# Patient Record
Sex: MALE | Race: WHITE | NOT HISPANIC OR LATINO | Employment: OTHER | RURAL
[De-identification: names, ages, dates, MRNs, and addresses within clinical notes are randomized per-mention and may not be internally consistent; named-entity substitution may affect disease eponyms.]

---

## 2017-12-22 ENCOUNTER — HISTORICAL (OUTPATIENT)
Dept: ADMINISTRATIVE | Facility: HOSPITAL | Age: 67
End: 2017-12-22

## 2017-12-26 LAB
LAB AP CLINICAL INFORMATION: NORMAL
LAB AP DIAGNOSIS - HISTORICAL: NORMAL
LAB AP GROSS PATHOLOGY - HISTORICAL: NORMAL
LAB AP SPECIMEN SUBMITTED - HISTORICAL: NORMAL

## 2020-03-06 ENCOUNTER — HISTORICAL (OUTPATIENT)
Dept: ADMINISTRATIVE | Facility: HOSPITAL | Age: 70
End: 2020-03-06

## 2020-03-06 LAB
HCT VFR BLD AUTO: 47 % (ref 40–54)
HGB BLD-MCNC: 15.6 G/DL (ref 13.5–18)
MCHC RBC AUTO-ENTMCNC: 33.2 G/DL (ref 32–36)

## 2020-03-19 ENCOUNTER — HISTORICAL (OUTPATIENT)
Dept: ADMINISTRATIVE | Facility: HOSPITAL | Age: 70
End: 2020-03-19

## 2020-09-18 ENCOUNTER — HISTORICAL (OUTPATIENT)
Dept: ADMINISTRATIVE | Facility: HOSPITAL | Age: 70
End: 2020-09-18

## 2020-09-19 LAB — SARS-COV+SARS-COV-2 AG RESP QL IA.RAPID: NEGATIVE

## 2020-12-15 ENCOUNTER — HISTORICAL (OUTPATIENT)
Dept: ADMINISTRATIVE | Facility: HOSPITAL | Age: 70
End: 2020-12-15

## 2020-12-15 LAB
ALBUMIN SERPL BCP-MCNC: 3.5 G/DL (ref 3.5–5)
ALBUMIN/GLOB SERPL: 0.9 {RATIO}
ALP SERPL-CCNC: 367 U/L (ref 45–115)
ALT SERPL W P-5'-P-CCNC: 635 U/L (ref 16–61)
AMYLASE SERPL-CCNC: 2468 U/L (ref 25–115)
ANION GAP SERPL CALCULATED.3IONS-SCNC: 15 MMOL/L
AST SERPL W P-5'-P-CCNC: 449 U/L (ref 15–37)
BACTERIA #/AREA URNS HPF: ABNORMAL /HPF
BASOPHILS # BLD AUTO: 0.03 X10E3/UL (ref 0–0.2)
BASOPHILS NFR BLD AUTO: 0.2 % (ref 0–1)
BILIRUB SERPL-MCNC: 7.3 MG/DL (ref 0–1.2)
BILIRUB UR QL STRIP: ABNORMAL MG/DL
BUN SERPL-MCNC: 11 MG/DL (ref 7–18)
BUN/CREAT SERPL: 12
CALCIUM SERPL-MCNC: 8.9 MG/DL (ref 8.5–10.1)
CHLORIDE SERPL-SCNC: 105 MMOL/L (ref 98–107)
CLARITY UR: CLEAR
CLARITY UR: CLEAR
CO2 SERPL-SCNC: 25 MMOL/L (ref 21–32)
COLOR UR: ABNORMAL
COLOR UR: ABNORMAL
CREAT SERPL-MCNC: 0.92 MG/DL (ref 0.7–1.3)
EOSINOPHIL # BLD AUTO: 0 X10E3/UL (ref 0–0.5)
EOSINOPHIL NFR BLD AUTO: 0 % (ref 1–4)
ERYTHROCYTE [DISTWIDTH] IN BLOOD BY AUTOMATED COUNT: 13.8 % (ref 11.5–14.5)
GLOBULIN SER-MCNC: 3.9 G/DL (ref 2–4)
GLUCOSE SERPL-MCNC: 151 MG/DL (ref 74–106)
GLUCOSE UR STRIP-MCNC: 250 MG/DL
HCT VFR BLD AUTO: 46.6 % (ref 40–54)
HGB BLD-MCNC: 15.8 G/DL (ref 13.5–18)
HYALINE CASTS #/AREA URNS LPF: ABNORMAL /LPF (ref 0–2)
IMM GRANULOCYTES # BLD AUTO: 0.03 X10E3/UL (ref 0–0.04)
IMM GRANULOCYTES NFR BLD: 0.2 % (ref 0–0.4)
KETONES UR STRIP-SCNC: 40 MG/DL
LEUKOCYTE ESTERASE UR QL STRIP: NEGATIVE LEU/UL
LIPASE SERPL-CCNC: ABNORMAL U/L (ref 73–393)
LYMPHOCYTES # BLD AUTO: 1 X10E3/UL (ref 1–4.8)
LYMPHOCYTES NFR BLD AUTO: 7.4 % (ref 27–41)
MCH RBC QN AUTO: 33 PG (ref 27–31)
MCHC RBC AUTO-ENTMCNC: 33.9 G/DL (ref 32–36)
MCV RBC AUTO: 97.3 FL (ref 80–96)
MONOCYTES # BLD AUTO: 0.51 X10E3/UL (ref 0–0.8)
MONOCYTES NFR BLD AUTO: 3.8 % (ref 2–6)
MPC BLD CALC-MCNC: 12 FL (ref 9.4–12.4)
MUCOUS THREADS #/AREA URNS HPF: ABNORMAL /HPF
NEUTROPHILS # BLD AUTO: 11.9 X10E3/UL (ref 1.8–7.7)
NEUTROPHILS NFR BLD AUTO: 88.4 % (ref 53–65)
NITRITE UR QL STRIP: NEGATIVE
PH UR STRIP: 6 PH UNITS (ref 5–8)
PLATELET # BLD AUTO: 167 X10E3/UL (ref 150–400)
POTASSIUM SERPL-SCNC: 3.6 MMOL/L (ref 3.5–5.1)
PROT SERPL-MCNC: 7.4 G/DL (ref 6.4–8.2)
PROT UR QL STRIP: 100 MG/DL
RBC # BLD AUTO: 4.79 X10E6/UL (ref 4.6–6.2)
RBC # UR STRIP: NEGATIVE ERY/UL
RBC #/AREA URNS HPF: ABNORMAL /HPF (ref 0–3)
SODIUM SERPL-SCNC: 141 MMOL/L (ref 136–145)
SP GR UR STRIP: 1.02 (ref 1–1.03)
SQUAMOUS #/AREA URNS LPF: ABNORMAL /LPF
UROBILINOGEN UR STRIP-ACNC: 2 EU/DL
WBC # BLD AUTO: 13.47 X10E3/UL (ref 4.5–11)
WBC #/AREA URNS HPF: ABNORMAL /HPF (ref 0–5)

## 2020-12-18 LAB
REPORT: NO GROWTH
REPORT: NORMAL

## 2021-03-11 VITALS — WEIGHT: 235 LBS | BODY MASS INDEX: 31.83 KG/M2 | HEIGHT: 72 IN

## 2021-03-11 RX ORDER — ATORVASTATIN CALCIUM 40 MG/1
40 TABLET, FILM COATED ORAL DAILY
COMMUNITY
End: 2022-04-04 | Stop reason: SDUPTHER

## 2021-03-11 RX ORDER — MONTELUKAST SODIUM 10 MG/1
10 TABLET ORAL DAILY
COMMUNITY
End: 2022-04-04 | Stop reason: SDUPTHER

## 2021-03-11 RX ORDER — TAMSULOSIN HYDROCHLORIDE 0.4 MG/1
0.4 CAPSULE ORAL DAILY
COMMUNITY
End: 2022-04-04 | Stop reason: SDUPTHER

## 2021-03-16 DIAGNOSIS — Z12.11 COLON CANCER SCREENING: Primary | ICD-10-CM

## 2021-03-17 DIAGNOSIS — Z11.59 SCREENING FOR VIRAL DISEASE: Primary | ICD-10-CM

## 2021-03-18 DIAGNOSIS — Z11.59 SCREENING FOR VIRAL DISEASE: Primary | ICD-10-CM

## 2021-03-19 ENCOUNTER — ANESTHESIA EVENT (OUTPATIENT)
Dept: GASTROENTEROLOGY | Facility: HOSPITAL | Age: 71
End: 2021-03-19
Payer: MEDICARE

## 2021-03-19 ENCOUNTER — HOSPITAL ENCOUNTER (OUTPATIENT)
Dept: GASTROENTEROLOGY | Facility: HOSPITAL | Age: 71
Discharge: HOME | End: 2021-03-19
Payer: MEDICARE

## 2021-03-19 ENCOUNTER — ANESTHESIA (OUTPATIENT)
Dept: GASTROENTEROLOGY | Facility: HOSPITAL | Age: 71
End: 2021-03-19
Payer: MEDICARE

## 2021-03-19 VITALS
HEART RATE: 57 BPM | RESPIRATION RATE: 13 BRPM | WEIGHT: 224 LBS | BODY MASS INDEX: 30.38 KG/M2 | TEMPERATURE: 98 F | SYSTOLIC BLOOD PRESSURE: 124 MMHG | OXYGEN SATURATION: 93 % | DIASTOLIC BLOOD PRESSURE: 73 MMHG

## 2021-03-19 DIAGNOSIS — Z12.11 COLON CANCER SCREENING: ICD-10-CM

## 2021-03-19 LAB
CTP QC/QA: YES
SARS-COV-2 RDRP RESP QL NAA+PROBE: NEGATIVE

## 2021-03-19 PROCEDURE — 37000008 HC ANESTHESIA 1ST 15 MINUTES

## 2021-03-19 PROCEDURE — 25000003 PHARM REV CODE 250: Performed by: NURSE ANESTHETIST, CERTIFIED REGISTERED

## 2021-03-19 PROCEDURE — 27000284 HC CANNULA NASAL: Performed by: NURSE ANESTHETIST, CERTIFIED REGISTERED

## 2021-03-19 PROCEDURE — D9220A PRA ANESTHESIA: ICD-10-PCS | Mod: ,,, | Performed by: NURSE ANESTHETIST, CERTIFIED REGISTERED

## 2021-03-19 PROCEDURE — D9220A PRA ANESTHESIA: Mod: ,,, | Performed by: NURSE ANESTHETIST, CERTIFIED REGISTERED

## 2021-03-19 PROCEDURE — 27201423 OPTIME MED/SURG SUP & DEVICES STERILE SUPPLY

## 2021-03-19 PROCEDURE — G0105 COLORECTAL SCRN; HI RISK IND: HCPCS

## 2021-03-19 PROCEDURE — 37000009 HC ANESTHESIA EA ADD 15 MINS

## 2021-03-19 RX ORDER — SODIUM CHLORIDE 0.9 % (FLUSH) 0.9 %
10 SYRINGE (ML) INJECTION
Status: DISCONTINUED | OUTPATIENT
Start: 2021-03-19 | End: 2021-03-20 | Stop reason: HOSPADM

## 2021-03-19 RX ORDER — LIDOCAINE HYDROCHLORIDE 20 MG/ML
INJECTION INTRAVENOUS
Status: DISCONTINUED | OUTPATIENT
Start: 2021-03-19 | End: 2021-03-19

## 2021-03-19 RX ORDER — PROPOFOL 10 MG/ML
VIAL (ML) INTRAVENOUS
Status: DISCONTINUED | OUTPATIENT
Start: 2021-03-19 | End: 2021-03-19

## 2021-03-19 RX ADMIN — Medication 50 MG: at 09:03

## 2021-03-19 RX ADMIN — Medication 100 MG: at 09:03

## 2021-03-19 RX ADMIN — LIDOCAINE HYDROCHLORIDE 50 MG: 20 INJECTION, SOLUTION INTRAVENOUS at 09:03

## 2021-06-16 ENCOUNTER — OFFICE VISIT (OUTPATIENT)
Dept: GASTROENTEROLOGY | Facility: CLINIC | Age: 71
End: 2021-06-16
Payer: MEDICARE

## 2021-06-16 VITALS
DIASTOLIC BLOOD PRESSURE: 78 MMHG | OXYGEN SATURATION: 97 % | BODY MASS INDEX: 28.85 KG/M2 | HEART RATE: 57 BPM | HEIGHT: 75 IN | SYSTOLIC BLOOD PRESSURE: 124 MMHG | WEIGHT: 232 LBS

## 2021-06-16 DIAGNOSIS — R10.9 ABDOMINAL PAIN, UNSPECIFIED ABDOMINAL LOCATION: Primary | ICD-10-CM

## 2021-06-16 DIAGNOSIS — K59.00 CONSTIPATION, UNSPECIFIED CONSTIPATION TYPE: ICD-10-CM

## 2021-06-16 DIAGNOSIS — R19.4 RECENT CHANGE IN FREQUENCY OF BOWEL MOVEMENTS: ICD-10-CM

## 2021-06-16 PROCEDURE — 99214 PR OFFICE/OUTPT VISIT, EST, LEVL IV, 30-39 MIN: ICD-10-PCS | Mod: ,,, | Performed by: NURSE PRACTITIONER

## 2021-06-16 PROCEDURE — 99214 OFFICE O/P EST MOD 30 MIN: CPT | Mod: ,,, | Performed by: NURSE PRACTITIONER

## 2021-06-17 ENCOUNTER — TELEPHONE (OUTPATIENT)
Dept: GASTROENTEROLOGY | Facility: CLINIC | Age: 71
End: 2021-06-17

## 2021-09-16 ENCOUNTER — OFFICE VISIT (OUTPATIENT)
Dept: GASTROENTEROLOGY | Facility: CLINIC | Age: 71
End: 2021-09-16
Payer: MEDICARE

## 2021-09-16 VITALS
WEIGHT: 239 LBS | HEIGHT: 75 IN | SYSTOLIC BLOOD PRESSURE: 137 MMHG | DIASTOLIC BLOOD PRESSURE: 76 MMHG | BODY MASS INDEX: 29.72 KG/M2 | OXYGEN SATURATION: 96 % | HEART RATE: 57 BPM

## 2021-09-16 DIAGNOSIS — R19.7 DIARRHEA, UNSPECIFIED TYPE: Primary | ICD-10-CM

## 2021-09-16 DIAGNOSIS — R19.4 RECENT CHANGE IN FREQUENCY OF BOWEL MOVEMENTS: ICD-10-CM

## 2021-09-16 DIAGNOSIS — K59.00 CONSTIPATION, UNSPECIFIED CONSTIPATION TYPE: ICD-10-CM

## 2021-09-16 DIAGNOSIS — R10.9 ABDOMINAL PAIN, UNSPECIFIED ABDOMINAL LOCATION: ICD-10-CM

## 2021-09-16 PROCEDURE — 99213 PR OFFICE/OUTPT VISIT, EST, LEVL III, 20-29 MIN: ICD-10-PCS | Mod: ,,, | Performed by: NURSE PRACTITIONER

## 2021-09-16 PROCEDURE — 99213 OFFICE O/P EST LOW 20 MIN: CPT | Mod: ,,, | Performed by: NURSE PRACTITIONER

## 2021-09-16 RX ORDER — MONTELUKAST SODIUM 4 MG/1
1 TABLET, CHEWABLE ORAL 2 TIMES DAILY
Qty: 180 TABLET | Refills: 3 | Status: SHIPPED | OUTPATIENT
Start: 2021-09-16 | End: 2021-11-04

## 2021-09-16 RX ORDER — TETRACYCLINE HYDROCHLORIDE 250 MG/1
250 CAPSULE ORAL 4 TIMES DAILY
COMMUNITY
End: 2023-07-05 | Stop reason: ALTCHOICE

## 2021-11-02 ENCOUNTER — CLINICAL SUPPORT (OUTPATIENT)
Dept: FAMILY MEDICINE | Facility: CLINIC | Age: 71
End: 2021-11-02
Payer: MEDICARE

## 2021-11-02 DIAGNOSIS — Z23 NEED FOR PROPHYLACTIC VACCINATION AND INOCULATION AGAINST INFLUENZA: Primary | ICD-10-CM

## 2021-11-02 PROCEDURE — G0008 FLU VACCINE - QUADRIVALENT - HIGH DOSE (65+) PRESERVATIVE FREE IM: ICD-10-PCS | Mod: ,,, | Performed by: NURSE PRACTITIONER

## 2021-11-02 PROCEDURE — 90662 IIV NO PRSV INCREASED AG IM: CPT | Mod: ,,, | Performed by: NURSE PRACTITIONER

## 2021-11-02 PROCEDURE — G0008 ADMIN INFLUENZA VIRUS VAC: HCPCS | Mod: ,,, | Performed by: NURSE PRACTITIONER

## 2021-11-02 PROCEDURE — 90662 FLU VACCINE - QUADRIVALENT - HIGH DOSE (65+) PRESERVATIVE FREE IM: ICD-10-PCS | Mod: ,,, | Performed by: NURSE PRACTITIONER

## 2021-11-04 ENCOUNTER — OFFICE VISIT (OUTPATIENT)
Dept: FAMILY MEDICINE | Facility: CLINIC | Age: 71
End: 2021-11-04
Payer: MEDICARE

## 2021-11-04 VITALS
BODY MASS INDEX: 29.97 KG/M2 | DIASTOLIC BLOOD PRESSURE: 74 MMHG | WEIGHT: 241 LBS | HEART RATE: 58 BPM | TEMPERATURE: 98 F | HEIGHT: 75 IN | OXYGEN SATURATION: 98 % | RESPIRATION RATE: 18 BRPM | SYSTOLIC BLOOD PRESSURE: 130 MMHG

## 2021-11-04 DIAGNOSIS — J34.89 LESION OF NASAL SEPTUM: Primary | ICD-10-CM

## 2021-11-04 PROCEDURE — 99213 PR OFFICE/OUTPT VISIT, EST, LEVL III, 20-29 MIN: ICD-10-PCS | Mod: ,,, | Performed by: NURSE PRACTITIONER

## 2021-11-04 PROCEDURE — 99213 OFFICE O/P EST LOW 20 MIN: CPT | Mod: ,,, | Performed by: NURSE PRACTITIONER

## 2021-11-04 RX ORDER — MUPIROCIN 20 MG/G
OINTMENT TOPICAL 3 TIMES DAILY
Qty: 15 G | Refills: 0 | Status: SHIPPED | OUTPATIENT
Start: 2021-11-04 | End: 2021-11-11

## 2021-11-08 ENCOUNTER — OFFICE VISIT (OUTPATIENT)
Dept: OTOLARYNGOLOGY | Facility: CLINIC | Age: 71
End: 2021-11-08
Payer: MEDICARE

## 2021-11-08 VITALS — WEIGHT: 241 LBS | HEIGHT: 75 IN | BODY MASS INDEX: 29.97 KG/M2

## 2021-11-08 DIAGNOSIS — J34.89 LESION OF NASAL SEPTUM: ICD-10-CM

## 2021-11-08 PROCEDURE — 99204 PR OFFICE/OUTPT VISIT, NEW, LEVL IV, 45-59 MIN: ICD-10-PCS | Mod: S$PBB,,, | Performed by: OTOLARYNGOLOGY

## 2021-11-08 PROCEDURE — 99213 OFFICE O/P EST LOW 20 MIN: CPT | Mod: PBBFAC | Performed by: OTOLARYNGOLOGY

## 2021-11-08 PROCEDURE — 99204 OFFICE O/P NEW MOD 45 MIN: CPT | Mod: S$PBB,,, | Performed by: OTOLARYNGOLOGY

## 2022-04-04 ENCOUNTER — OFFICE VISIT (OUTPATIENT)
Dept: FAMILY MEDICINE | Facility: CLINIC | Age: 72
End: 2022-04-04
Payer: MEDICARE

## 2022-04-04 VITALS
HEART RATE: 68 BPM | HEIGHT: 75 IN | RESPIRATION RATE: 18 BRPM | WEIGHT: 240 LBS | DIASTOLIC BLOOD PRESSURE: 79 MMHG | OXYGEN SATURATION: 98 % | BODY MASS INDEX: 29.84 KG/M2 | SYSTOLIC BLOOD PRESSURE: 128 MMHG | TEMPERATURE: 98 F

## 2022-04-04 DIAGNOSIS — J32.1 FRONTAL SINUSITIS, UNSPECIFIED CHRONICITY: Primary | ICD-10-CM

## 2022-04-04 DIAGNOSIS — R53.83 FATIGUE, UNSPECIFIED TYPE: ICD-10-CM

## 2022-04-04 DIAGNOSIS — Z12.5 SCREENING FOR PROSTATE CANCER: ICD-10-CM

## 2022-04-04 DIAGNOSIS — E78.5 HYPERLIPIDEMIA, UNSPECIFIED HYPERLIPIDEMIA TYPE: ICD-10-CM

## 2022-04-04 LAB
ALBUMIN SERPL BCP-MCNC: 3.9 G/DL (ref 3.5–5)
ALBUMIN/GLOB SERPL: 1.1 {RATIO}
ALP SERPL-CCNC: 116 U/L (ref 45–115)
ALT SERPL W P-5'-P-CCNC: 35 U/L (ref 16–61)
ANION GAP SERPL CALCULATED.3IONS-SCNC: 11 MMOL/L (ref 7–16)
AST SERPL W P-5'-P-CCNC: 26 U/L (ref 15–37)
BASOPHILS # BLD AUTO: 0.04 K/UL (ref 0–0.2)
BASOPHILS NFR BLD AUTO: 0.6 % (ref 0–1)
BILIRUB SERPL-MCNC: 0.8 MG/DL (ref 0–1.2)
BUN SERPL-MCNC: 11 MG/DL (ref 7–18)
BUN/CREAT SERPL: 14 (ref 6–20)
CALCIUM SERPL-MCNC: 9.2 MG/DL (ref 8.5–10.1)
CHLORIDE SERPL-SCNC: 105 MMOL/L (ref 98–107)
CHOLEST SERPL-MCNC: 140 MG/DL (ref 0–200)
CHOLEST/HDLC SERPL: 3.2 {RATIO}
CO2 SERPL-SCNC: 30 MMOL/L (ref 21–32)
CREAT SERPL-MCNC: 0.78 MG/DL (ref 0.7–1.3)
DIFFERENTIAL METHOD BLD: ABNORMAL
EOSINOPHIL # BLD AUTO: 0.07 K/UL (ref 0–0.5)
EOSINOPHIL NFR BLD AUTO: 1.1 % (ref 1–4)
ERYTHROCYTE [DISTWIDTH] IN BLOOD BY AUTOMATED COUNT: 13.3 % (ref 11.5–14.5)
GLOBULIN SER-MCNC: 3.5 G/DL (ref 2–4)
GLUCOSE SERPL-MCNC: 99 MG/DL (ref 74–106)
HCT VFR BLD AUTO: 48.4 % (ref 40–54)
HDLC SERPL-MCNC: 44 MG/DL (ref 40–60)
HGB BLD-MCNC: 15.9 G/DL (ref 13.5–18)
IMM GRANULOCYTES # BLD AUTO: 0.02 K/UL (ref 0–0.04)
IMM GRANULOCYTES NFR BLD: 0.3 % (ref 0–0.4)
LDLC SERPL CALC-MCNC: 73 MG/DL
LDLC/HDLC SERPL: 1.7 {RATIO}
LYMPHOCYTES # BLD AUTO: 2.09 K/UL (ref 1–4.8)
LYMPHOCYTES NFR BLD AUTO: 32.9 % (ref 27–41)
MCH RBC QN AUTO: 32.5 PG (ref 27–31)
MCHC RBC AUTO-ENTMCNC: 32.9 G/DL (ref 32–36)
MCV RBC AUTO: 99 FL (ref 80–96)
MONOCYTES # BLD AUTO: 0.4 K/UL (ref 0–0.8)
MONOCYTES NFR BLD AUTO: 6.3 % (ref 2–6)
MPC BLD CALC-MCNC: 12.9 FL (ref 9.4–12.4)
NEUTROPHILS # BLD AUTO: 3.73 K/UL (ref 1.8–7.7)
NEUTROPHILS NFR BLD AUTO: 58.8 % (ref 53–65)
NONHDLC SERPL-MCNC: 96 MG/DL
NRBC # BLD AUTO: 0 X10E3/UL
NRBC, AUTO (.00): 0 %
PLATELET # BLD AUTO: 167 K/UL (ref 150–400)
POTASSIUM SERPL-SCNC: 4.7 MMOL/L (ref 3.5–5.1)
PROT SERPL-MCNC: 7.4 G/DL (ref 6.4–8.2)
PSA SERPL-MCNC: 0.48 NG/ML (ref 0–4.4)
RBC # BLD AUTO: 4.89 M/UL (ref 4.6–6.2)
SODIUM SERPL-SCNC: 141 MMOL/L (ref 136–145)
TRIGL SERPL-MCNC: 114 MG/DL (ref 35–150)
VLDLC SERPL-MCNC: 23 MG/DL
WBC # BLD AUTO: 6.35 K/UL (ref 4.5–11)

## 2022-04-04 PROCEDURE — 80053 COMPREHEN METABOLIC PANEL: CPT | Mod: ,,, | Performed by: CLINICAL MEDICAL LABORATORY

## 2022-04-04 PROCEDURE — 99213 PR OFFICE/OUTPT VISIT, EST, LEVL III, 20-29 MIN: ICD-10-PCS | Mod: ,,, | Performed by: NURSE PRACTITIONER

## 2022-04-04 PROCEDURE — 99213 OFFICE O/P EST LOW 20 MIN: CPT | Mod: ,,, | Performed by: NURSE PRACTITIONER

## 2022-04-04 PROCEDURE — 80053 COMPREHENSIVE METABOLIC PANEL: ICD-10-PCS | Mod: ,,, | Performed by: CLINICAL MEDICAL LABORATORY

## 2022-04-04 PROCEDURE — 85025 COMPLETE CBC W/AUTO DIFF WBC: CPT | Mod: ,,, | Performed by: CLINICAL MEDICAL LABORATORY

## 2022-04-04 PROCEDURE — 80061 LIPID PANEL: CPT | Mod: ,,, | Performed by: CLINICAL MEDICAL LABORATORY

## 2022-04-04 PROCEDURE — 85025 CBC WITH DIFFERENTIAL: ICD-10-PCS | Mod: ,,, | Performed by: CLINICAL MEDICAL LABORATORY

## 2022-04-04 PROCEDURE — G0103 PSA, SCREENING: ICD-10-PCS | Mod: ,,, | Performed by: CLINICAL MEDICAL LABORATORY

## 2022-04-04 PROCEDURE — G0103 PSA SCREENING: HCPCS | Mod: ,,, | Performed by: CLINICAL MEDICAL LABORATORY

## 2022-04-04 PROCEDURE — 80061 LIPID PANEL: ICD-10-PCS | Mod: ,,, | Performed by: CLINICAL MEDICAL LABORATORY

## 2022-04-04 RX ORDER — TAMSULOSIN HYDROCHLORIDE 0.4 MG/1
0.4 CAPSULE ORAL DAILY
Qty: 90 CAPSULE | Refills: 1 | Status: SHIPPED | OUTPATIENT
Start: 2022-04-04 | End: 2022-10-05

## 2022-04-04 RX ORDER — ALBUTEROL SULFATE 90 UG/1
2 AEROSOL, METERED RESPIRATORY (INHALATION) EVERY 6 HOURS PRN
Qty: 18 G | Refills: 3 | Status: SHIPPED | OUTPATIENT
Start: 2022-04-04 | End: 2023-11-06

## 2022-04-04 RX ORDER — MONTELUKAST SODIUM 10 MG/1
10 TABLET ORAL DAILY
Qty: 90 TABLET | Refills: 1 | Status: SHIPPED | OUTPATIENT
Start: 2022-04-04 | End: 2022-10-05

## 2022-04-04 RX ORDER — ATORVASTATIN CALCIUM 40 MG/1
40 TABLET, FILM COATED ORAL DAILY
Qty: 90 TABLET | Refills: 1 | Status: SHIPPED | OUTPATIENT
Start: 2022-04-04 | End: 2022-10-05

## 2022-04-04 RX ORDER — CEFUROXIME AXETIL 500 MG/1
500 TABLET ORAL 2 TIMES DAILY
Qty: 20 TABLET | Refills: 0 | Status: SHIPPED | OUTPATIENT
Start: 2022-04-04 | End: 2023-01-10 | Stop reason: ALTCHOICE

## 2022-04-04 RX ORDER — BECLOMETHASONE DIPROPIONATE HFA 80 UG/1
1 AEROSOL, METERED RESPIRATORY (INHALATION) DAILY PRN
Qty: 10.6 G | Refills: 1 | Status: SHIPPED | OUTPATIENT
Start: 2022-04-04 | End: 2023-11-06

## 2022-04-04 NOTE — PROGRESS NOTES
Subjective:       Patient ID: Phuc Thorne is a 71 y.o. male.    Chief Complaint: Cough (3-4 weeks would like inhaler and cough medication ) and Medication Refill    Mr. Thorne presents to clinic with complaints of nasal congestion/drainage, headache, sinus congestion, and nonproductive cough. He reports he uses Qvar as needed for cough, denies a h/o asthma or COPD. He is due for fasting labs today, he is up to date on colonoscopy.    Review of Systems   Constitutional: Negative for chills and fever.   HENT: Positive for nasal congestion, postnasal drip, sinus pressure/congestion and sore throat.    Respiratory: Positive for cough. Negative for chest tightness, shortness of breath and wheezing.    Cardiovascular: Negative.    Gastrointestinal: Negative.    Genitourinary: Negative.    Neurological: Negative.    Psychiatric/Behavioral: Negative.          Objective:      Physical Exam  Vitals and nursing note reviewed.   Constitutional:       Appearance: Normal appearance. He is obese.   HENT:      Head: Normocephalic.   Eyes:      Pupils: Pupils are equal, round, and reactive to light.   Cardiovascular:      Rate and Rhythm: Normal rate and regular rhythm.      Pulses: Normal pulses.      Heart sounds: Normal heart sounds.   Pulmonary:      Effort: Pulmonary effort is normal.      Breath sounds: Normal breath sounds.   Abdominal:      General: Bowel sounds are normal.      Palpations: Abdomen is soft.   Musculoskeletal:         General: Normal range of motion.      Cervical back: Normal range of motion and neck supple.   Skin:     General: Skin is warm and dry.   Neurological:      Mental Status: He is alert and oriented to person, place, and time.   Psychiatric:         Behavior: Behavior normal.         Assessment:       Problem List Items Addressed This Visit    None     Visit Diagnoses     Frontal sinusitis, unspecified chronicity    -  Primary    Hyperlipidemia, unspecified hyperlipidemia type        Relevant  Orders    Lipid Panel    Comprehensive Metabolic Panel    Fatigue, unspecified type        Relevant Orders    CBC Auto Differential    Comprehensive Metabolic Panel    Screening for prostate cancer        Relevant Orders    PSA, Screening          Plan:        1. Sinusitis - Ceftin 500mg BID X 10 days. Requesting Qvar for allergies, not covered by [employer] and no indication. Consider ventolin or PFT if not previously done.   2. Continue current meds, fasting labs today - we will notify you of results.

## 2022-09-27 ENCOUNTER — PATIENT MESSAGE (OUTPATIENT)
Dept: FAMILY MEDICINE | Facility: CLINIC | Age: 72
End: 2022-09-27
Payer: MEDICARE

## 2022-09-29 RX ORDER — ATORVASTATIN CALCIUM 40 MG/1
40 TABLET, FILM COATED ORAL DAILY
Qty: 90 TABLET | Refills: 1 | Status: CANCELLED | OUTPATIENT
Start: 2022-09-29

## 2022-09-29 RX ORDER — TAMSULOSIN HYDROCHLORIDE 0.4 MG/1
0.4 CAPSULE ORAL DAILY
Qty: 90 CAPSULE | Refills: 1 | Status: CANCELLED | OUTPATIENT
Start: 2022-09-29

## 2022-09-29 RX ORDER — MONTELUKAST SODIUM 10 MG/1
10 TABLET ORAL DAILY
Qty: 90 TABLET | Refills: 1 | Status: CANCELLED | OUTPATIENT
Start: 2022-09-29

## 2022-10-04 RX ORDER — MONTELUKAST SODIUM 10 MG/1
10 TABLET ORAL DAILY
Qty: 90 TABLET | Refills: 1 | Status: CANCELLED | OUTPATIENT
Start: 2022-10-04

## 2022-10-04 RX ORDER — TAMSULOSIN HYDROCHLORIDE 0.4 MG/1
0.4 CAPSULE ORAL DAILY
Qty: 90 CAPSULE | Refills: 1 | Status: CANCELLED | OUTPATIENT
Start: 2022-10-04

## 2022-10-04 RX ORDER — ATORVASTATIN CALCIUM 40 MG/1
40 TABLET, FILM COATED ORAL DAILY
Qty: 90 TABLET | Refills: 1 | Status: CANCELLED | OUTPATIENT
Start: 2022-10-04

## 2022-10-11 RX ORDER — ATORVASTATIN CALCIUM 40 MG/1
40 TABLET, FILM COATED ORAL DAILY
Qty: 90 TABLET | Refills: 1 | OUTPATIENT
Start: 2022-10-11

## 2022-10-11 RX ORDER — TAMSULOSIN HYDROCHLORIDE 0.4 MG/1
1 CAPSULE ORAL DAILY
Qty: 90 CAPSULE | Refills: 1 | OUTPATIENT
Start: 2022-10-11

## 2022-10-11 RX ORDER — MONTELUKAST SODIUM 10 MG/1
10 TABLET ORAL DAILY
Qty: 90 TABLET | Refills: 1 | OUTPATIENT
Start: 2022-10-11

## 2022-10-25 ENCOUNTER — CLINICAL SUPPORT (OUTPATIENT)
Dept: FAMILY MEDICINE | Facility: CLINIC | Age: 72
End: 2022-10-25
Payer: MEDICARE

## 2022-10-25 DIAGNOSIS — Z23 NEED FOR PROPHYLACTIC VACCINATION AND INOCULATION AGAINST INFLUENZA: Primary | ICD-10-CM

## 2022-10-25 PROCEDURE — G0008 FLU VACCINE - QUADRIVALENT - ADJUVANTED: ICD-10-PCS | Mod: ,,, | Performed by: FAMILY MEDICINE

## 2022-10-25 PROCEDURE — G0008 ADMIN INFLUENZA VIRUS VAC: HCPCS | Mod: ,,, | Performed by: FAMILY MEDICINE

## 2022-10-25 PROCEDURE — 90694 VACC AIIV4 NO PRSRV 0.5ML IM: CPT | Mod: ,,, | Performed by: FAMILY MEDICINE

## 2022-10-25 PROCEDURE — 90694 FLU VACCINE - QUADRIVALENT - ADJUVANTED: ICD-10-PCS | Mod: ,,, | Performed by: FAMILY MEDICINE

## 2023-01-08 ENCOUNTER — PATIENT MESSAGE (OUTPATIENT)
Dept: FAMILY MEDICINE | Facility: CLINIC | Age: 73
End: 2023-01-08
Payer: MEDICARE

## 2023-01-10 ENCOUNTER — OFFICE VISIT (OUTPATIENT)
Dept: FAMILY MEDICINE | Facility: CLINIC | Age: 73
End: 2023-01-10
Payer: MEDICARE

## 2023-01-10 VITALS — WEIGHT: 241 LBS | BODY MASS INDEX: 29.97 KG/M2 | HEIGHT: 75 IN

## 2023-01-10 DIAGNOSIS — R05.1 ACUTE COUGH: ICD-10-CM

## 2023-01-10 DIAGNOSIS — U07.1 COVID-19 VIRUS INFECTION: Primary | ICD-10-CM

## 2023-01-10 PROCEDURE — 99212 OFFICE O/P EST SF 10 MIN: CPT | Mod: 95,,, | Performed by: NURSE PRACTITIONER

## 2023-01-10 PROCEDURE — 99212 PR OFFICE/OUTPT VISIT, EST, LEVL II, 10-19 MIN: ICD-10-PCS | Mod: 95,,, | Performed by: NURSE PRACTITIONER

## 2023-01-10 RX ORDER — AZITHROMYCIN 250 MG/1
TABLET, FILM COATED ORAL
Qty: 6 TABLET | Refills: 0 | Status: SHIPPED | OUTPATIENT
Start: 2023-01-10 | End: 2023-01-15

## 2023-01-10 RX ORDER — BENZONATATE 100 MG/1
100 CAPSULE ORAL 3 TIMES DAILY PRN
Qty: 30 CAPSULE | Refills: 1 | Status: SHIPPED | OUTPATIENT
Start: 2023-01-10 | End: 2023-01-20

## 2023-01-10 RX ORDER — PROMETHAZINE HYDROCHLORIDE AND DEXTROMETHORPHAN HYDROBROMIDE 6.25; 15 MG/5ML; MG/5ML
5 SYRUP ORAL NIGHTLY PRN
Qty: 118 ML | Refills: 0 | Status: SHIPPED | OUTPATIENT
Start: 2023-01-10 | End: 2023-01-20

## 2023-01-10 NOTE — PROGRESS NOTES
Subjective:       Patient ID: Phuc Thorne is a 72 y.o. male.    Chief Complaint: Cough (Productive/Positive home covid test this past Saturday. All symptoms started Saturday ) and Headache    Mr. Thorne was seen in audio/visual telehealth appointment, he reports taking a positive home Covid test 4 days ago. He notes that he is improving but has a persistent cough that is worse at night. He denies wheezing and shortness of breath.    Cough  This is a new problem. The current episode started in the past 7 days. The problem has been waxing and waning. The problem occurs every few hours. The cough is Non-productive. Associated symptoms include chills, a fever, headaches, myalgias, nasal congestion and rhinorrhea. Pertinent negatives include no chest pain, ear congestion, ear pain, heartburn, hemoptysis, postnasal drip, rash, sore throat, shortness of breath, sweats, weight loss or wheezing. The symptoms are aggravated by lying down. He has tried OTC cough suppressant, a beta-agonist inhaler, body position changes and leukotriene antagonists for the symptoms. The treatment provided mild relief. His past medical history is significant for environmental allergies. There is no history of asthma, bronchiectasis, bronchitis, COPD, emphysema or pneumonia.   Review of Systems   Constitutional:  Positive for chills and fever. Negative for weight loss.   HENT:  Positive for rhinorrhea. Negative for ear pain, postnasal drip and sore throat.    Respiratory:  Positive for cough. Negative for hemoptysis, shortness of breath and wheezing.    Cardiovascular:  Negative for chest pain.   Gastrointestinal: Negative.  Negative for heartburn.   Musculoskeletal:  Positive for myalgias.   Integumentary:  Negative for rash.   Allergic/Immunologic: Positive for environmental allergies.   Neurological:  Positive for headaches.       Objective:      Physical Exam  Vitals and nursing note reviewed.   Constitutional:       Appearance: Normal  appearance.   HENT:      Head: Normocephalic.   Pulmonary:      Effort: Pulmonary effort is normal. No respiratory distress.   Skin:     General: Skin is warm and dry.   Neurological:      Mental Status: He is alert and oriented to person, place, and time.   Psychiatric:         Behavior: Behavior normal.       Assessment:       Problem List Items Addressed This Visit    None  Visit Diagnoses       COVID-19 virus infection    -  Primary    Acute cough                Plan:        Take Zpack as directed, promethazine dm QHS prn cough, tessalon perles prn cough. RTC if symptoms worsen or persist. Discussed paxlovid and pt. Not interested in this option.

## 2023-02-09 DIAGNOSIS — Z71.89 COMPLEX CARE COORDINATION: ICD-10-CM

## 2023-06-30 NOTE — PROGRESS NOTES
[facility]  Chief Complaint      Chief Complaint   Patient presents with    Wellness Check-up     Pt is fasting. No complaints this am    Health Maintenance     Pt defers HepC/abdominal aortic aneurysm screening and shingles/covid vaccines at this time         History of Present Illness      Phuc Thorne is a 72 y.o. male with chronic conditions of Hyperlipidemia, Obstructive Sleep Apnea, Benign Prostatic Hypertrophy, Obesity, Bilateral Age-Related Nuclear Cataract, Bilateral Dry Eye Syndrome, and  who presents today for wellness exam. He does c/o fatigue today. He reports that he is napping more than normal. He is fasting for labs today. He did not bring his medication bottles in today. His BMI today is 31.12. He reports that is he fairly active. He reports that he and his family rarely eat out. He declines Hep C screening, AAA screening, Shingles, and Covid Vaccine. He understands the deleterious effects of not having these health maintenance issues addressed.       Past Medical History:  Past Medical History:   Diagnosis Date    Cobalamin deficiency     GERD (gastroesophageal reflux disease)     High cholesterol     Sleep apnea        Past Surgical History:   has a past surgical history that includes Shoulder surgery (Right); Neck surgery; Colonoscopy (2012); Hip surgery; Hand surgery; Hernia repair; Cholecystectomy (12/2020); Colonoscopy (12/22/2017); Colonoscopy (03/21/2021); and Cholecystectomy (12/2020).    Social History:  Social History     Tobacco Use    Smoking status: Former    Smokeless tobacco: Never   Substance Use Topics    Alcohol use: Not Currently    Drug use: Never       I personally reviewed all past medical, surgical, and social.     Review of Systems   Constitutional:  Positive for fatigue. Negative for chills and fever.   HENT:  Positive for tinnitus. Negative for congestion, hearing loss and rhinorrhea.    Eyes:  Negative for visual disturbance.   Respiratory:  Negative  for shortness of breath.    Cardiovascular:  Negative for chest pain.   Gastrointestinal:  Negative for abdominal pain, constipation and diarrhea.   Genitourinary:  Negative for dysuria, frequency and urgency.   Musculoskeletal:  Negative for arthralgias, back pain, joint swelling and neck pain.   Neurological:  Negative for dizziness and headaches.   Psychiatric/Behavioral:  Negative for dysphoric mood and sleep disturbance. The patient is not nervous/anxious.       Medications:  Outpatient Encounter Medications as of 7/5/2023   Medication Sig Dispense Refill    albuterol (VENTOLIN HFA) 90 mcg/actuation inhaler Inhale 2 puffs into the lungs every 6 (six) hours as needed for Wheezing. Rescue 18 g 3    atorvastatin (LIPITOR) 40 MG tablet TAKE 1 TABLET BY MOUTH EVERY DAY 90 tablet 1    beclomethasone (QVAR REDIHALER) 80 mcg/actuation inhaler Inhale 1 puff into the lungs daily as needed (asthma). 10.6 g 1    montelukast (SINGULAIR) 10 mg tablet TAKE 1 TABLET BY MOUTH EVERY DAY 90 tablet 1    tamsulosin (FLOMAX) 0.4 mg Cap TAKE 1 CAPSULE BY MOUTH EVERY DAY 90 capsule 1    tetracycline (ACHROMYCIN,SUMYCIN) 250 MG capsule Take 250 mg by mouth 4 (four) times daily.       No facility-administered encounter medications on file as of 7/5/2023.       Allergies:  Review of patient's allergies indicates:   Allergen Reactions    Codeine Other (See Comments)    Opioids - morphine analogues     Shrimp        Health Maintenance:  Immunization History   Administered Date(s) Administered    COVID-19 MRNA, LN-S PF (MODERNA HALF 0.25 ML DOSE) 11/11/2021    COVID-19, MRNA, LN-S, PF (MODERNA FULL 0.5 ML DOSE) 01/19/2021, 02/21/2021    Influenza (FLUAD) - Quadrivalent - Adjuvanted - PF *Preferred* (65+) 10/25/2022    Influenza - High Dose - PF (65 years and older) 11/05/2018, 11/11/2019, 10/06/2020    Influenza - Quadrivalent - High Dose - PF (65 years and older) 11/02/2021    Influenza - Quadrivalent - PF *Preferred* (6 months and older)  11/01/2017    Pneumococcal Conjugate - 13 Valent 11/05/2018    Pneumococcal Polysaccharide - 23 Valent 11/01/2017    Tdap 07/27/2017        Health Maintenance   Topic Date Due    Hepatitis C Screening  Never done    Abdominal Aortic Aneurysm Screening  Never done    Lipid Panel  04/04/2027    TETANUS VACCINE  07/27/2027        Physical Exam        Physical Exam  Constitutional:       Appearance: He is obese.   HENT:      Head: Normocephalic.      Right Ear: External ear normal.      Left Ear: External ear normal.   Cardiovascular:      Rate and Rhythm: Normal rate and regular rhythm.      Pulses: Normal pulses.      Heart sounds: Normal heart sounds.   Pulmonary:      Effort: Pulmonary effort is normal.      Breath sounds: Normal breath sounds.   Abdominal:      General: Bowel sounds are normal.      Palpations: Abdomen is soft.      Tenderness: There is no abdominal tenderness.   Skin:     General: Skin is warm and dry.   Neurological:      Mental Status: He is alert and oriented to person, place, and time.   Psychiatric:         Mood and Affect: Mood normal.         Behavior: Behavior normal.        Laboratory:    Lab Results   Component Value Date    GLU 99 04/04/2022     04/04/2022    K 4.7 04/04/2022     04/04/2022    CO2 30 04/04/2022    BUN 11 04/04/2022    CREATININE 0.78 04/04/2022    CALCIUM 9.2 04/04/2022    PROT 7.4 04/04/2022    ALBUMIN 3.9 04/04/2022    BILITOT 0.8 04/04/2022    ALKPHOS 116 (H) 04/04/2022    AST 26 04/04/2022    ALT 35 04/04/2022    ANIONGAP 11 04/04/2022    ESTGFRAFRICA 105 12/15/2020    EGFRNONAA 104 04/04/2022       Lab Results   Component Value Date    WBC 6.35 04/04/2022    RBC 4.89 04/04/2022    HGB 15.9 04/04/2022    HCT 48.4 04/04/2022    MCV 99.0 (H) 04/04/2022    RDW 13.3 04/04/2022     04/04/2022        Lab Results   Component Value Date    CHOL 140 04/04/2022    TRIG 114 04/04/2022    HDL 44 04/04/2022    LDLCALC 73 04/04/2022       No results found for:  TSH    No results found for: HGBA1C, ESTIMATEDAVG     No results found for: JJPPRUAZ38    No results found for: [id number]    Lab Results   Component Value Date    PSA 0.478 04/04/2022         Point Of Care Testing:  Nitrites, UA   Date Value Ref Range Status   12/15/2020 Negative Negative      Urobilinogen, UA   Date Value Ref Range Status   12/15/2020 2 (A) Normal,0.2,1 EU/DL      pH, UA   Date Value Ref Range Status   12/15/2020 6.0 5.0 - 8.0 pH Units      Specific Gravity, UA   Date Value Ref Range Status   12/15/2020 1.020 1.000 - 1.030      Ketones, UA   Date Value Ref Range Status   12/15/2020 40 (A) Negative mg/dL        Assessment/Plan     Hyperlipidemia, unspecified hyperlipidemia type  -     Lipid Panel; Future; Expected date: 07/05/2023    Fatigue, unspecified type  -     CBC Auto Differential; Future; Expected date: 07/05/2023  -     Comprehensive Metabolic Panel; Future; Expected date: 07/05/2023  -     Vitamin B12; Future; Expected date: 07/05/2023  -     Testosterone, Total and Free, S; Future; Expected date: 07/05/2023  -     TSH; Future; Expected date: 07/05/2023  -     Vitamin D; Future; Expected date: 07/05/2023    Encounter for vitamin deficiency screening  -     Vitamin B12; Future; Expected date: 07/05/2023  -     Vitamin D; Future; Expected date: 07/05/2023    Screening PSA (prostate specific antigen)  -     PSA, Screening; Future; Expected date: 07/05/2023    Hx of hyperglycemia  -     Hemoglobin A1C; Future; Expected date: 07/05/2023    Screening for thyroid disorder  -     TSH; Future; Expected date: 07/05/2023      Return to clinic in 6 months and/or sooner as needed.    Questions answered to desired level of satisfaction    Verbalized understanding to all information and instructions provided    MIRIAN Mccarthy-[facility] [place]

## 2023-07-05 ENCOUNTER — OFFICE VISIT (OUTPATIENT)
Dept: FAMILY MEDICINE | Facility: CLINIC | Age: 73
End: 2023-07-05
Payer: MEDICARE

## 2023-07-05 VITALS
DIASTOLIC BLOOD PRESSURE: 82 MMHG | TEMPERATURE: 98 F | OXYGEN SATURATION: 95 % | HEART RATE: 65 BPM | BODY MASS INDEX: 30.96 KG/M2 | HEIGHT: 75 IN | RESPIRATION RATE: 18 BRPM | WEIGHT: 249 LBS | SYSTOLIC BLOOD PRESSURE: 130 MMHG

## 2023-07-05 DIAGNOSIS — Z13.21 ENCOUNTER FOR VITAMIN DEFICIENCY SCREENING: ICD-10-CM

## 2023-07-05 DIAGNOSIS — R53.83 FATIGUE, UNSPECIFIED TYPE: ICD-10-CM

## 2023-07-05 DIAGNOSIS — Z13.29 SCREENING FOR THYROID DISORDER: ICD-10-CM

## 2023-07-05 DIAGNOSIS — E78.5 HYPERLIPIDEMIA, UNSPECIFIED HYPERLIPIDEMIA TYPE: Primary | ICD-10-CM

## 2023-07-05 DIAGNOSIS — Z12.5 SCREENING PSA (PROSTATE SPECIFIC ANTIGEN): ICD-10-CM

## 2023-07-05 DIAGNOSIS — Z86.39 HX OF HYPERGLYCEMIA: ICD-10-CM

## 2023-07-05 LAB
25(OH)D3 SERPL-MCNC: 32.9 NG/ML
ALBUMIN SERPL BCP-MCNC: 3.8 G/DL (ref 3.5–5)
ALBUMIN/GLOB SERPL: 1.1 {RATIO}
ALP SERPL-CCNC: 106 U/L (ref 45–115)
ALT SERPL W P-5'-P-CCNC: 46 U/L (ref 16–61)
ANION GAP SERPL CALCULATED.3IONS-SCNC: 12 MMOL/L (ref 7–16)
AST SERPL W P-5'-P-CCNC: 41 U/L (ref 15–37)
BASOPHILS # BLD AUTO: 0.04 K/UL (ref 0–0.2)
BASOPHILS NFR BLD AUTO: 0.7 % (ref 0–1)
BILIRUB SERPL-MCNC: 0.6 MG/DL (ref ?–1.2)
BUN SERPL-MCNC: 11 MG/DL (ref 7–18)
BUN/CREAT SERPL: 14 (ref 6–20)
CALCIUM SERPL-MCNC: 9.2 MG/DL (ref 8.5–10.1)
CHLORIDE SERPL-SCNC: 108 MMOL/L (ref 98–107)
CHOLEST SERPL-MCNC: 138 MG/DL (ref 0–200)
CHOLEST/HDLC SERPL: 2.9 {RATIO}
CO2 SERPL-SCNC: 26 MMOL/L (ref 21–32)
CREAT SERPL-MCNC: 0.8 MG/DL (ref 0.7–1.3)
DIFFERENTIAL METHOD BLD: ABNORMAL
EGFR (NO RACE VARIABLE) (RUSH/TITUS): 94 ML/MIN/1.73M2
EOSINOPHIL # BLD AUTO: 0.06 K/UL (ref 0–0.5)
EOSINOPHIL NFR BLD AUTO: 1 % (ref 1–4)
ERYTHROCYTE [DISTWIDTH] IN BLOOD BY AUTOMATED COUNT: 13 % (ref 11.5–14.5)
EST. AVERAGE GLUCOSE BLD GHB EST-MCNC: 147 MG/DL
GLOBULIN SER-MCNC: 3.5 G/DL (ref 2–4)
GLUCOSE SERPL-MCNC: 145 MG/DL (ref 74–106)
HBA1C MFR BLD HPLC: 7 % (ref 4.5–6.6)
HCT VFR BLD AUTO: 46.5 % (ref 40–54)
HDLC SERPL-MCNC: 47 MG/DL (ref 40–60)
HGB BLD-MCNC: 15.7 G/DL (ref 13.5–18)
IMM GRANULOCYTES # BLD AUTO: 0.02 K/UL (ref 0–0.04)
IMM GRANULOCYTES NFR BLD: 0.3 % (ref 0–0.4)
LDLC SERPL CALC-MCNC: 65 MG/DL
LDLC/HDLC SERPL: 1.4 {RATIO}
LYMPHOCYTES # BLD AUTO: 1.94 K/UL (ref 1–4.8)
LYMPHOCYTES NFR BLD AUTO: 33.3 % (ref 27–41)
MCH RBC QN AUTO: 32 PG (ref 27–31)
MCHC RBC AUTO-ENTMCNC: 33.8 G/DL (ref 32–36)
MCV RBC AUTO: 94.7 FL (ref 80–96)
MONOCYTES # BLD AUTO: 0.37 K/UL (ref 0–0.8)
MONOCYTES NFR BLD AUTO: 6.3 % (ref 2–6)
MPC BLD CALC-MCNC: 13 FL (ref 9.4–12.4)
NEUTROPHILS # BLD AUTO: 3.4 K/UL (ref 1.8–7.7)
NEUTROPHILS NFR BLD AUTO: 58.4 % (ref 53–65)
NONHDLC SERPL-MCNC: 91 MG/DL
NRBC # BLD AUTO: 0 X10E3/UL
NRBC, AUTO (.00): 0 %
PLATELET # BLD AUTO: 145 K/UL (ref 150–400)
POTASSIUM SERPL-SCNC: 4.4 MMOL/L (ref 3.5–5.1)
PROT SERPL-MCNC: 7.3 G/DL (ref 6.4–8.2)
PSA SERPL-MCNC: 0.42 NG/ML
RBC # BLD AUTO: 4.91 M/UL (ref 4.6–6.2)
SODIUM SERPL-SCNC: 142 MMOL/L (ref 136–145)
TRIGL SERPL-MCNC: 131 MG/DL (ref 35–150)
TSH SERPL DL<=0.005 MIU/L-ACNC: 1.52 UIU/ML (ref 0.36–3.74)
VIT B12 SERPL-MCNC: 150 PG/ML (ref 193–986)
VLDLC SERPL-MCNC: 26 MG/DL
WBC # BLD AUTO: 5.83 K/UL (ref 4.5–11)

## 2023-07-05 PROCEDURE — G0103 PSA, SCREENING: ICD-10-PCS | Mod: ,,, | Performed by: CLINICAL MEDICAL LABORATORY

## 2023-07-05 PROCEDURE — 82306 VITAMIN D: ICD-10-PCS | Mod: GZ,,, | Performed by: CLINICAL MEDICAL LABORATORY

## 2023-07-05 PROCEDURE — 85025 CBC WITH DIFFERENTIAL: ICD-10-PCS | Mod: ,,, | Performed by: CLINICAL MEDICAL LABORATORY

## 2023-07-05 PROCEDURE — 84443 ASSAY THYROID STIM HORMONE: CPT | Mod: ,,, | Performed by: CLINICAL MEDICAL LABORATORY

## 2023-07-05 PROCEDURE — 84403 ASSAY OF TOTAL TESTOSTERONE: CPT | Mod: 90,,, | Performed by: CLINICAL MEDICAL LABORATORY

## 2023-07-05 PROCEDURE — 82607 VITAMIN B-12: CPT | Mod: GZ,,, | Performed by: CLINICAL MEDICAL LABORATORY

## 2023-07-05 PROCEDURE — 80061 LIPID PANEL: ICD-10-PCS | Mod: ,,, | Performed by: CLINICAL MEDICAL LABORATORY

## 2023-07-05 PROCEDURE — 82607 VITAMIN B12: ICD-10-PCS | Mod: GZ,,, | Performed by: CLINICAL MEDICAL LABORATORY

## 2023-07-05 PROCEDURE — 80061 LIPID PANEL: CPT | Mod: ,,, | Performed by: CLINICAL MEDICAL LABORATORY

## 2023-07-05 PROCEDURE — 83036 HEMOGLOBIN GLYCOSYLATED A1C: CPT | Mod: GZ,,, | Performed by: CLINICAL MEDICAL LABORATORY

## 2023-07-05 PROCEDURE — 80053 COMPREHENSIVE METABOLIC PANEL: ICD-10-PCS | Mod: ,,, | Performed by: CLINICAL MEDICAL LABORATORY

## 2023-07-05 PROCEDURE — 84402 TESTOSTERONE, FREE (DIALYSIS) AND TOTAL, LC/MS/MS: ICD-10-PCS | Mod: 90,,, | Performed by: CLINICAL MEDICAL LABORATORY

## 2023-07-05 PROCEDURE — 82306 VITAMIN D 25 HYDROXY: CPT | Mod: GZ,,, | Performed by: CLINICAL MEDICAL LABORATORY

## 2023-07-05 PROCEDURE — 99213 PR OFFICE/OUTPT VISIT, EST, LEVL III, 20-29 MIN: ICD-10-PCS | Mod: ,,, | Performed by: NURSE PRACTITIONER

## 2023-07-05 PROCEDURE — 84403 TESTOSTERONE, FREE (DIALYSIS) AND TOTAL, LC/MS/MS: ICD-10-PCS | Mod: 90,,, | Performed by: CLINICAL MEDICAL LABORATORY

## 2023-07-05 PROCEDURE — 99213 OFFICE O/P EST LOW 20 MIN: CPT | Mod: ,,, | Performed by: NURSE PRACTITIONER

## 2023-07-05 PROCEDURE — 80053 COMPREHEN METABOLIC PANEL: CPT | Mod: ,,, | Performed by: CLINICAL MEDICAL LABORATORY

## 2023-07-05 PROCEDURE — 85025 COMPLETE CBC W/AUTO DIFF WBC: CPT | Mod: ,,, | Performed by: CLINICAL MEDICAL LABORATORY

## 2023-07-05 PROCEDURE — 83036 HEMOGLOBIN A1C: ICD-10-PCS | Mod: GZ,,, | Performed by: CLINICAL MEDICAL LABORATORY

## 2023-07-05 PROCEDURE — 84443 TSH: ICD-10-PCS | Mod: ,,, | Performed by: CLINICAL MEDICAL LABORATORY

## 2023-07-05 PROCEDURE — 84402 ASSAY OF FREE TESTOSTERONE: CPT | Mod: 90,,, | Performed by: CLINICAL MEDICAL LABORATORY

## 2023-07-05 PROCEDURE — G0103 PSA SCREENING: HCPCS | Mod: ,,, | Performed by: CLINICAL MEDICAL LABORATORY

## 2023-07-07 ENCOUNTER — TELEPHONE (OUTPATIENT)
Dept: FAMILY MEDICINE | Facility: CLINIC | Age: 73
End: 2023-07-07
Payer: MEDICARE

## 2023-07-07 NOTE — TELEPHONE ENCOUNTER
----- Message from Ami Bernstein NP sent at 7/7/2023 10:27 AM CDT -----  Please contact patient and see if he can return to clinic next Wednesday to discuss abnormal labs. Blood Glucose abnormal, Vitamin B 12 low, and Vitamin D on the lower limits of normal.

## 2023-07-12 ENCOUNTER — OFFICE VISIT (OUTPATIENT)
Dept: FAMILY MEDICINE | Facility: CLINIC | Age: 73
End: 2023-07-12
Payer: MEDICARE

## 2023-07-12 VITALS
RESPIRATION RATE: 18 BRPM | SYSTOLIC BLOOD PRESSURE: 134 MMHG | DIASTOLIC BLOOD PRESSURE: 64 MMHG | WEIGHT: 243 LBS | BODY MASS INDEX: 30.21 KG/M2 | HEART RATE: 67 BPM | HEIGHT: 75 IN | OXYGEN SATURATION: 95 %

## 2023-07-12 DIAGNOSIS — E53.8 VITAMIN B12 DEFICIENCY: ICD-10-CM

## 2023-07-12 DIAGNOSIS — E11.9 NEW ONSET TYPE 2 DIABETES MELLITUS: Primary | ICD-10-CM

## 2023-07-12 DIAGNOSIS — E55.9 VITAMIN D DEFICIENCY: ICD-10-CM

## 2023-07-12 DIAGNOSIS — D69.6 THROMBOCYTOPENIA: ICD-10-CM

## 2023-07-12 LAB
GLUCOSE SERPL-MCNC: 124 MG/DL (ref 70–110)
TESTOST FREE SERPL-MCNC: 6.3 NG/DL (ref 3.28–12.2)
TESTOST SERPL-MCNC: 325 NG/DL (ref 240–950)

## 2023-07-12 PROCEDURE — 96372 THER/PROPH/DIAG INJ SC/IM: CPT | Mod: ,,, | Performed by: NURSE PRACTITIONER

## 2023-07-12 PROCEDURE — 99213 PR OFFICE/OUTPT VISIT, EST, LEVL III, 20-29 MIN: ICD-10-PCS | Mod: ,,, | Performed by: NURSE PRACTITIONER

## 2023-07-12 PROCEDURE — 99213 OFFICE O/P EST LOW 20 MIN: CPT | Mod: ,,, | Performed by: NURSE PRACTITIONER

## 2023-07-12 PROCEDURE — 96372 PR INJECTION,THERAP/PROPH/DIAG2ST, IM OR SUBCUT: ICD-10-PCS | Mod: ,,, | Performed by: NURSE PRACTITIONER

## 2023-07-12 PROCEDURE — 82962 GLUCOSE BLOOD TEST: CPT | Mod: RHCUB | Performed by: NURSE PRACTITIONER

## 2023-07-12 RX ORDER — CYANOCOBALAMIN 1000 UG/ML
INJECTION, SOLUTION INTRAMUSCULAR; SUBCUTANEOUS
Qty: 7 ML | Refills: 0 | Status: SHIPPED | OUTPATIENT
Start: 2023-07-12 | End: 2023-10-16 | Stop reason: SDUPTHER

## 2023-07-12 RX ORDER — CYANOCOBALAMIN 1000 UG/ML
1000 INJECTION, SOLUTION INTRAMUSCULAR; SUBCUTANEOUS ONCE
Status: COMPLETED | OUTPATIENT
Start: 2023-07-12 | End: 2023-07-12

## 2023-07-12 RX ADMIN — CYANOCOBALAMIN 1000 MCG: 1000 INJECTION, SOLUTION INTRAMUSCULAR; SUBCUTANEOUS at 04:07

## 2023-07-12 NOTE — PROGRESS NOTES
[facility] [place]  Chief Complaint      Chief Complaint   Patient presents with    Results     Follow up labs from previous visit       History of Present Illness      Phuc Thorne is a 72 y.o. male with chronic conditions of Hyperlipidemia, Obstructive Sleep Apnea, Benign Prostatic Hypertrophy, Obesity, Bilateral Age-Related Nuclear Cataract, Bilateral Dry Eye Syndrome, and Obesity who presents today for follow up abnormal labs from previous visit. Mr. Thorne has new onset diabetes with A1c of 7.0%. His Vitamin B 12 level was 150 (193-986). Discussed the need for B 12 injections. He also was found to have thrombocytopenia with platelet count of 145 (145-400).  He also reported that his paternal grandfather  from AAA and he does not decline screening. He reports he will get this done through his cardiologist's office. He reports that since he looked at his labs results he has changed his eating habits somewhat. His BMI today is 30.37 which is down from previous visit when it was 31.12. We discussed the potential of starting medication versus trying diet and exercise for 3 months. He opted to try diet and exercise for 3 months and does not wish to monitor glucose levels at this time. Today in clinic his glucose level is 124.     Care Gaps Discussed:  Colorectal Cancer Screening  2021 Dr. FRANCISCA Silver repeat in 5 years   Hemoglobin A1c 2023 7.0%  Diabetes Urine Screening collect at next visit   Lipid Panel 2023 Chol 138 Trig 131 LDL 65 HDL 47  Statin Therapy currently not on a statin  Eye Exam  2023 Dr. Margret Fu repeat in 12 months 2024  Foot Exam perform at next visit   Tetanus Vaccine 2017 due for repeat 2027  Shingles Vaccine overdue  Influenza Vaccine 10/25/2022 due for repeat on/around 10/25/2023  Pneumonia Vaccine #1 Prevnar 13 2018 #2 Pneumovax 23   Covid 19 Vaccine 2021 overdue for 4th  Patient tolerated procedure well and was extubated and transferred to SICU without any vasopressor support dose since 01/06/2022  AAA Screening wishes to have this set up by his cardiologist Dr. CHARLES Montemayor  Hep C Screening overdue    Care Team:  PCP Dr. Davis   Optometry Dr. Margret Fu  Cardiology Dr. CHARLES Montemayor  Sleep Medicine Dulce Martinez, NP Dr. Katiuska Villalpando   Orthopedic Dr. Anibal King  ENT Dr. Rodríguez Nguyễn       Past Medical History:  Past Medical History:   Diagnosis Date    Cobalamin deficiency     GERD (gastroesophageal reflux disease)     High cholesterol     Sleep apnea        Past Surgical History:   has a past surgical history that includes Shoulder surgery (Right); Neck surgery; Colonoscopy (2012); Hip surgery; Hand surgery; Hernia repair; Cholecystectomy (12/2020); Colonoscopy (12/22/2017); Colonoscopy (03/21/2021); and Cholecystectomy (12/2020).    Social History:  Social History     Tobacco Use    Smoking status: Former    Smokeless tobacco: Never   Substance Use Topics    Alcohol use: Not Currently    Drug use: Never       I personally reviewed all past medical, surgical, and social.     Review of Systems   Constitutional:  Positive for fatigue. Negative for chills and fever.   HENT:  Positive for tinnitus. Negative for congestion, hearing loss and rhinorrhea.    Eyes:  Negative for visual disturbance.   Respiratory:  Negative for shortness of breath.    Cardiovascular:  Negative for chest pain.   Gastrointestinal:  Negative for abdominal pain, constipation and diarrhea.   Genitourinary:  Negative for dysuria, frequency and urgency.   Musculoskeletal:  Negative for arthralgias, back pain, joint swelling and neck pain.   Neurological:  Negative for dizziness and headaches.   Psychiatric/Behavioral:  Negative for dysphoric mood and sleep disturbance. The patient is not nervous/anxious.       Medications:  Outpatient Encounter Medications as of 7/12/2023   Medication Sig Dispense Refill    albuterol (VENTOLIN HFA) 90 mcg/actuation inhaler Inhale 2 puffs into the lungs every 6 (six)  "hours as needed for Wheezing. Rescue 18 g 3    atorvastatin (LIPITOR) 40 MG tablet TAKE 1 TABLET BY MOUTH EVERY DAY 90 tablet 1    beclomethasone (QVAR REDIHALER) 80 mcg/actuation inhaler Inhale 1 puff into the lungs daily as needed (asthma). 10.6 g 1    montelukast (SINGULAIR) 10 mg tablet TAKE 1 TABLET BY MOUTH EVERY DAY 90 tablet 1    tamsulosin (FLOMAX) 0.4 mg Cap TAKE 1 CAPSULE BY MOUTH EVERY DAY 90 capsule 1    cyanocobalamin 1,000 mcg/mL injection Inject 1 mL (1,000 mcg total) into the muscle every 7 days for 30 days, THEN 1 mL (1,000 mcg total) every 30 days. 7 mL 0    syringe with needle 1 mL 28 gauge x 1/2" Syrg 1 Syringe by Misc.(Non-Drug; Combo Route) route every 7 days for 30 days, THEN 1 Syringe every 30 days. To be used for B 12 injections.. 7 each 0     Facility-Administered Encounter Medications as of 7/12/2023   Medication Dose Route Frequency Provider Last Rate Last Admin    [COMPLETED] cyanocobalamin injection 1,000 mcg  1,000 mcg Intramuscular Once Ami Bernstein NP   1,000 mcg at 07/12/23 1640       Allergies:  Review of patient's allergies indicates:   Allergen Reactions    Codeine Other (See Comments)    Opioids - morphine analogues     Shrimp        Health Maintenance:  Immunization History   Administered Date(s) Administered    COVID-19 MRNA, LN-S PF (MODERNA HALF 0.25 ML DOSE) 11/11/2021    COVID-19, MRNA, LN-S, PF (MODERNA FULL 0.5 ML DOSE) 01/19/2021, 02/21/2021    Influenza (FLUAD) - Quadrivalent - Adjuvanted - PF *Preferred* (65+) 10/25/2022    Influenza - High Dose - PF (65 years and older) 11/05/2018, 11/11/2019, 10/06/2020    Influenza - Quadrivalent - High Dose - PF (65 years and older) 11/02/2021    Influenza - Quadrivalent - PF *Preferred* (6 months and older) 11/01/2017    Pneumococcal Conjugate - 13 Valent 11/05/2018    Pneumococcal Polysaccharide - 23 Valent 11/01/2017    Tdap 07/27/2017        Health Maintenance   Topic Date Due    Hepatitis C Screening  Never done-overdue     " Abdominal Aortic Aneurysm Screening  Never done-will get this scheduled through cardiology office f/u at next visit    TETANUS VACCINE  07/27/2027    Lipid Panel  07/05/2028        Physical Exam      Physical Exam  Constitutional:       Appearance: He is obese.   HENT:      Head: Normocephalic.      Right Ear: External ear normal.      Left Ear: External ear normal.   Cardiovascular:      Rate and Rhythm: Normal rate and regular rhythm.      Pulses: Normal pulses.      Heart sounds: Normal heart sounds.   Pulmonary:      Effort: Pulmonary effort is normal.      Breath sounds: Normal breath sounds.   Abdominal:      General: Bowel sounds are normal.      Palpations: Abdomen is soft.      Tenderness: There is no abdominal tenderness.   Skin:     General: Skin is warm and dry.   Neurological:      Mental Status: He is alert and oriented to person, place, and time.   Psychiatric:         Mood and Affect: Mood normal.         Behavior: Behavior normal.        Laboratory:    Lab Results   Component Value Date     (H) 07/05/2023     07/05/2023    K 4.4 07/05/2023     (H) 07/05/2023    CO2 26 07/05/2023    BUN 11 07/05/2023    CREATININE 0.80 07/05/2023    CALCIUM 9.2 07/05/2023    PROT 7.3 07/05/2023    ALBUMIN 3.8 07/05/2023    BILITOT 0.6 07/05/2023    ALKPHOS 106 07/05/2023    AST 41 (H) 07/05/2023    ALT 46 07/05/2023    ANIONGAP 12 07/05/2023    ESTGFRAFRICA 105 12/15/2020    EGFRNONAA 104 04/04/2022       Lab Results   Component Value Date    WBC 5.83 07/05/2023    RBC 4.91 07/05/2023    HGB 15.7 07/05/2023    HCT 46.5 07/05/2023    MCV 94.7 07/05/2023    RDW 13.0 07/05/2023     (L) 07/05/2023        Lab Results   Component Value Date    CHOL 138 07/05/2023    TRIG 131 07/05/2023    HDL 47 07/05/2023    LDLCALC 65 07/05/2023       Lab Results   Component Value Date    TSH 1.520 07/05/2023       Lab Results   Component Value Date    HGBA1C 7.0 (H) 07/05/2023    ESTIMATEDAVG 147 07/05/2023     "    Lab Results   Component Value Date    BZYRTQOM35 150 (L) 07/05/2023       Lab Results   Component Value Date    HUGWGLHG57PE 32.9 07/05/2023       Lab Results   Component Value Date    PSA 0.416 07/05/2023         Point Of Care Testing:  Nitrites, UA   Date Value Ref Range Status   12/15/2020 Negative Negative      Urobilinogen, UA   Date Value Ref Range Status   12/15/2020 2 (A) Normal,0.2,1 EU/DL      pH, UA   Date Value Ref Range Status   12/15/2020 6.0 5.0 - 8.0 pH Units      Specific Gravity, UA   Date Value Ref Range Status   12/15/2020 1.020 1.000 - 1.030      Ketones, UA   Date Value Ref Range Status   12/15/2020 40 (A) Negative mg/dL        No results found for: [id number], RAPFLUA, RAPFLUB      Assessment/Plan     New onset type 2 diabetes mellitus  -     POCT Glucose, Hand-Held Device    Vitamin B12 deficiency  -     cyanocobalamin injection 1,000 mcg  -     cyanocobalamin 1,000 mcg/mL injection; Inject 1 mL (1,000 mcg total) into the muscle every 7 days for 30 days, THEN 1 mL (1,000 mcg total) every 30 days.  Dispense: 7 mL; Refill: 0  -     syringe with needle 1 mL 28 gauge x 1/2" Syrg; 1 Syringe by Misc.(Non-Drug; Combo Route) route every 7 days for 30 days, THEN 1 Syringe every 30 days. To be used for B 12 injections..  Dispense: 7 each; Refill: 0    Vitamin D deficiency    Thrombocytopenia      Return to clinic in 3 months, will need repeat A1c, Foot Exam, Follow up on AAA Screening, repeat B 12 level to determine if injections are to be continued or if he can be switched to oral supplementation, consider repeat Vitamin D level since OTC Vitamin D was also started this visit. Monitor Platelet count. Collect urine for microalbumin.     Nurse to update family history.     Questions answered to desired level of satisfaction    Verbalized understanding to all information and instructions provided    MIRIAN Mccarthy-[facility]    " Patient tolerated procedure well and was extubated and transferred to PACU no

## 2023-07-12 NOTE — PATIENT INSTRUCTIONS
Please bring ALL medications bottles (from ALL providers) including over-the-counter medications to your next appointment !!!       Get over-the-counter Vitamin D 800 IU and take once daily     Vitamin B12 injections once weekly for 4 weeks, then one monthly.   1st Injection today in clinic 07/12/2023, 2nd 07/19/26, 3rd 07/26/2023 4th 08/02/2023, then start monthly on 09/02/2023    Monitor for any abnormal bleeding     Discuss with with Dr. Montemayor about getting screening for abdominal aortic aneurysm

## 2023-09-09 DIAGNOSIS — Z71.89 COMPLEX CARE COORDINATION: ICD-10-CM

## 2023-10-02 RX ORDER — TAMSULOSIN HYDROCHLORIDE 0.4 MG/1
CAPSULE ORAL
Qty: 90 CAPSULE | Refills: 0 | Status: SHIPPED | OUTPATIENT
Start: 2023-10-02 | End: 2023-12-28

## 2023-10-02 RX ORDER — MONTELUKAST SODIUM 10 MG/1
TABLET ORAL
Qty: 90 TABLET | Refills: 0 | Status: SHIPPED | OUTPATIENT
Start: 2023-10-02 | End: 2023-12-28

## 2023-10-02 RX ORDER — ATORVASTATIN CALCIUM 40 MG/1
TABLET, FILM COATED ORAL
Qty: 90 TABLET | Refills: 0 | Status: SHIPPED | OUTPATIENT
Start: 2023-10-02 | End: 2023-12-28

## 2023-10-16 ENCOUNTER — OFFICE VISIT (OUTPATIENT)
Dept: FAMILY MEDICINE | Facility: CLINIC | Age: 73
End: 2023-10-16
Payer: MEDICARE

## 2023-10-16 VITALS
SYSTOLIC BLOOD PRESSURE: 132 MMHG | OXYGEN SATURATION: 96 % | HEART RATE: 62 BPM | RESPIRATION RATE: 19 BRPM | DIASTOLIC BLOOD PRESSURE: 84 MMHG | HEIGHT: 75 IN | BODY MASS INDEX: 29.47 KG/M2 | WEIGHT: 237 LBS | TEMPERATURE: 98 F

## 2023-10-16 DIAGNOSIS — E53.8 VITAMIN B12 DEFICIENCY: ICD-10-CM

## 2023-10-16 DIAGNOSIS — E11.9 NEW ONSET TYPE 2 DIABETES MELLITUS: ICD-10-CM

## 2023-10-16 DIAGNOSIS — Z23 NEED FOR IMMUNIZATION AGAINST INFLUENZA: Primary | ICD-10-CM

## 2023-10-16 DIAGNOSIS — Z23 NEED FOR STREPTOCOCCUS PNEUMONIAE AND INFLUENZA VACCINATION: ICD-10-CM

## 2023-10-16 LAB
EST. AVERAGE GLUCOSE BLD GHB EST-MCNC: 127 MG/DL
HBA1C MFR BLD HPLC: 6.4 % (ref 4.5–6.6)

## 2023-10-16 PROCEDURE — 99213 PR OFFICE/OUTPT VISIT, EST, LEVL III, 20-29 MIN: ICD-10-PCS | Mod: ,,, | Performed by: FAMILY MEDICINE

## 2023-10-16 PROCEDURE — G0009 ADMIN PNEUMOCOCCAL VACCINE: HCPCS | Mod: ,,, | Performed by: FAMILY MEDICINE

## 2023-10-16 PROCEDURE — 83036 HEMOGLOBIN GLYCOSYLATED A1C: CPT | Mod: ,,, | Performed by: CLINICAL MEDICAL LABORATORY

## 2023-10-16 PROCEDURE — G0009 PNEUMOCOCCAL CONJUGATE VACCINE 20-VALENT: ICD-10-PCS | Mod: ,,, | Performed by: FAMILY MEDICINE

## 2023-10-16 PROCEDURE — 90694 FLU VACCINE - QUADRIVALENT - ADJUVANTED: ICD-10-PCS | Mod: ,,, | Performed by: FAMILY MEDICINE

## 2023-10-16 PROCEDURE — 99213 OFFICE O/P EST LOW 20 MIN: CPT | Mod: ,,, | Performed by: FAMILY MEDICINE

## 2023-10-16 PROCEDURE — 83036 HEMOGLOBIN A1C: ICD-10-PCS | Mod: ,,, | Performed by: CLINICAL MEDICAL LABORATORY

## 2023-10-16 PROCEDURE — 90677 PCV20 VACCINE IM: CPT | Mod: ,,, | Performed by: FAMILY MEDICINE

## 2023-10-16 PROCEDURE — G0008 FLU VACCINE - QUADRIVALENT - ADJUVANTED: ICD-10-PCS | Mod: ,,, | Performed by: FAMILY MEDICINE

## 2023-10-16 PROCEDURE — G0008 ADMIN INFLUENZA VIRUS VAC: HCPCS | Mod: ,,, | Performed by: FAMILY MEDICINE

## 2023-10-16 PROCEDURE — 90677 PNEUMOCOCCAL CONJUGATE VACCINE 20-VALENT: ICD-10-PCS | Mod: ,,, | Performed by: FAMILY MEDICINE

## 2023-10-16 PROCEDURE — 90694 VACC AIIV4 NO PRSRV 0.5ML IM: CPT | Mod: ,,, | Performed by: FAMILY MEDICINE

## 2023-10-16 RX ORDER — BENZONATATE 100 MG/1
100 CAPSULE ORAL 3 TIMES DAILY PRN
COMMUNITY
Start: 2023-07-17

## 2023-10-16 RX ORDER — CYANOCOBALAMIN 1000 UG/ML
INJECTION, SOLUTION INTRAMUSCULAR; SUBCUTANEOUS
Qty: 7 ML | Refills: 0 | Status: SHIPPED | OUTPATIENT
Start: 2023-10-16 | End: 2023-11-28

## 2023-10-16 NOTE — PROGRESS NOTES
"Phuc Thorne is a 72 y.o. male seen today for follow-up on his elevated blood sugars.  Patient is due for his A1c today and has been working on his diet.  We discussed diet and exercise and the mechanism of type 2 diabetes if present will need treatment more than is dietary changes.  Patient is also due his Prevnar 20 which is currently unavailable and his flu vaccination today.    Past Medical History:   Diagnosis Date    Cobalamin deficiency     GERD (gastroesophageal reflux disease)     High cholesterol     Sleep apnea      Family History   Problem Relation Age of Onset    Aortic aneurysm Paternal Grandfather      Current Outpatient Medications on File Prior to Visit   Medication Sig Dispense Refill    albuterol (VENTOLIN HFA) 90 mcg/actuation inhaler Inhale 2 puffs into the lungs every 6 (six) hours as needed for Wheezing. Rescue 18 g 3    atorvastatin (LIPITOR) 40 MG tablet TAKE 1 TABLET BY MOUTH EVERY DAY 90 tablet 0    beclomethasone (QVAR REDIHALER) 80 mcg/actuation inhaler Inhale 1 puff into the lungs daily as needed (asthma). 10.6 g 1    benzonatate (TESSALON) 100 MG capsule Take 100 mg by mouth 3 (three) times daily as needed.      montelukast (SINGULAIR) 10 mg tablet TAKE 1 TABLET BY MOUTH EVERY DAY 90 tablet 0    syringe with needle 1 mL 28 gauge x 1/2" Syrg 1 Syringe by Misc.(Non-Drug; Combo Route) route every 7 days for 30 days, THEN 1 Syringe every 30 days. To be used for B 12 injections.. 7 each 0    tamsulosin (FLOMAX) 0.4 mg Cap TAKE 1 CAPSULE BY MOUTH EVERY DAY 90 capsule 0    [DISCONTINUED] cyanocobalamin 1,000 mcg/mL injection Inject 1 mL (1,000 mcg total) into the muscle every 7 days for 30 days, THEN 1 mL (1,000 mcg total) every 30 days. 7 mL 0     No current facility-administered medications on file prior to visit.     Immunization History   Administered Date(s) Administered    COVID-19 MRNA, LN-S PF (MODERNA HALF 0.25 ML DOSE) 11/11/2021    COVID-19, MRNA, LN-S, PF (MODERNA FULL 0.5 ML " DOSE) 01/19/2021, 02/21/2021    Influenza (FLUAD) - Quadrivalent - Adjuvanted - PF *Preferred* (65+) 10/25/2022    Influenza - High Dose - PF (65 years and older) 11/05/2018, 11/11/2019, 10/06/2020    Influenza - Quadrivalent - High Dose - PF (65 years and older) 11/02/2021    Influenza - Quadrivalent - PF *Preferred* (6 months and older) 11/01/2017    Pneumococcal Conjugate - 13 Valent 11/05/2018    Pneumococcal Polysaccharide - 23 Valent 11/01/2017    Tdap 07/27/2017       Review of Systems   Constitutional:  Negative for fever, malaise/fatigue and weight loss.   Respiratory:  Negative for shortness of breath.    Cardiovascular:  Negative for chest pain and palpitations.   Gastrointestinal:  Negative for nausea and vomiting.   Psychiatric/Behavioral:  Negative for depression.         Vitals:    10/16/23 0821   BP: 132/84   Pulse: 62   Resp: 19   Temp: 97.9 °F (36.6 °C)       Physical Exam  Vitals reviewed.   Constitutional:       Appearance: Normal appearance.   HENT:      Head: Normocephalic.   Eyes:      Extraocular Movements: Extraocular movements intact.      Conjunctiva/sclera: Conjunctivae normal.      Pupils: Pupils are equal, round, and reactive to light.   Neck:      Thyroid: No thyroid mass or thyromegaly.   Cardiovascular:      Rate and Rhythm: Normal rate and regular rhythm.      Heart sounds: Normal heart sounds. No murmur heard.     No gallop.   Pulmonary:      Effort: Pulmonary effort is normal. No respiratory distress.      Breath sounds: Normal breath sounds. No wheezing or rales.   Skin:     General: Skin is warm and dry.      Coloration: Skin is not jaundiced or pale.   Neurological:      Mental Status: He is alert.   Psychiatric:         Mood and Affect: Mood normal.         Behavior: Behavior normal.         Thought Content: Thought content normal.         Judgment: Judgment normal.          Assessment and Plan  1. Need for immunization against influenza  -     Influenza (FLUAD) - Quadrivalent  (Adjuvanted) *Preferred* (65+) (PF)    2. Vitamin B12 deficiency  -     cyanocobalamin 1,000 mcg/mL injection; Inject 1 mL (1,000 mcg total) into the muscle every 7 days for 30 days, THEN 1 mL (1,000 mcg total) every 30 days.  Dispense: 7 mL; Refill: 0    3. New onset type 2 diabetes mellitus  -     Hemoglobin A1C; Future; Expected date: 10/16/2023             Return to clinic in 3 weeks to review labs are as needed.    Health Maintenance Topics with due status: Not Due       Topic Last Completion Date    TETANUS VACCINE 07/27/2017    Colorectal Cancer Screening 03/19/2021    Lipid Panel 07/05/2023

## 2023-10-20 ENCOUNTER — TELEPHONE (OUTPATIENT)
Dept: FAMILY MEDICINE | Facility: CLINIC | Age: 73
End: 2023-10-20
Payer: MEDICARE

## 2023-10-20 NOTE — TELEPHONE ENCOUNTER
----- Message from Sherry Sen LPN sent at 10/17/2023 10:06 AM CDT -----    ----- Message -----  From: Oliver Davis MD  Sent: 10/17/2023   7:55 AM CDT  To: [facility] Staff    A1c is excellent.

## 2023-11-02 ENCOUNTER — OFFICE VISIT (OUTPATIENT)
Dept: CARDIOLOGY | Facility: CLINIC | Age: 73
End: 2023-11-02
Payer: MEDICARE

## 2023-11-02 VITALS
HEIGHT: 75 IN | OXYGEN SATURATION: 95 % | BODY MASS INDEX: 29.59 KG/M2 | DIASTOLIC BLOOD PRESSURE: 80 MMHG | SYSTOLIC BLOOD PRESSURE: 122 MMHG | WEIGHT: 238 LBS | HEART RATE: 67 BPM

## 2023-11-02 DIAGNOSIS — K21.9 GASTROESOPHAGEAL REFLUX DISEASE, UNSPECIFIED WHETHER ESOPHAGITIS PRESENT: Chronic | ICD-10-CM

## 2023-11-02 DIAGNOSIS — E53.8 B12 DEFICIENCY: Chronic | ICD-10-CM

## 2023-11-02 DIAGNOSIS — R94.31 NONSPECIFIC ABNORMAL ELECTROCARDIOGRAM (ECG) (EKG): ICD-10-CM

## 2023-11-02 DIAGNOSIS — R00.1 BRADYCARDIA: Primary | ICD-10-CM

## 2023-11-02 DIAGNOSIS — E78.5 HYPERLIPIDEMIA, UNSPECIFIED HYPERLIPIDEMIA TYPE: Chronic | ICD-10-CM

## 2023-11-02 PROCEDURE — 99213 OFFICE O/P EST LOW 20 MIN: CPT | Mod: PBBFAC | Performed by: INTERNAL MEDICINE

## 2023-11-02 PROCEDURE — 93010 EKG 12-LEAD: ICD-10-PCS | Mod: S$PBB,,, | Performed by: INTERNAL MEDICINE

## 2023-11-02 PROCEDURE — 99204 PR OFFICE/OUTPT VISIT, NEW, LEVL IV, 45-59 MIN: ICD-10-PCS | Mod: S$PBB,,, | Performed by: INTERNAL MEDICINE

## 2023-11-02 PROCEDURE — 93005 ELECTROCARDIOGRAM TRACING: CPT | Mod: PBBFAC,59 | Performed by: INTERNAL MEDICINE

## 2023-11-02 PROCEDURE — 93010 ELECTROCARDIOGRAM REPORT: CPT | Mod: S$PBB,,, | Performed by: INTERNAL MEDICINE

## 2023-11-02 PROCEDURE — 99204 OFFICE O/P NEW MOD 45 MIN: CPT | Mod: S$PBB,,, | Performed by: INTERNAL MEDICINE

## 2023-11-02 PROCEDURE — 93246 EXT ECG>7D<15D RECORDING: CPT | Performed by: INTERNAL MEDICINE

## 2023-11-06 NOTE — PROGRESS NOTES
PCP: Antonia Jeffries FNP    Referring Provider:     Subjective:   Phuc Thorne is a 72 y.o. male with hx of HLD, GERD, B12 deficiency, and VEENA who presents for c/o bradycardia. Does yard work without problems, no SOB.     Fhx:  Family History   Problem Relation Age of Onset    Aortic aneurysm Paternal Grandfather      Shx:   Social History     Socioeconomic History    Marital status:    Tobacco Use    Smoking status: Former     Passive exposure: Past    Smokeless tobacco: Never   Substance and Sexual Activity    Alcohol use: Not Currently    Drug use: Never       EKG   11/2/23--NSR, minimal voltage criteria for LVH, 66 bpm    ECHO and EST in 2016- records not available      Lab Results   Component Value Date     07/05/2023    K 4.4 07/05/2023     (H) 07/05/2023    CO2 26 07/05/2023    BUN 11 07/05/2023    CREATININE 0.80 07/05/2023    CALCIUM 9.2 07/05/2023    ANIONGAP 12 07/05/2023    ESTGFRAFRICA 105 12/15/2020    EGFRNONAA 104 04/04/2022       Lab Results   Component Value Date    CHOL 138 07/05/2023    CHOL 140 04/04/2022     Lab Results   Component Value Date    HDL 47 07/05/2023    HDL 44 04/04/2022     Lab Results   Component Value Date    LDLCALC 65 07/05/2023    LDLCALC 73 04/04/2022     Lab Results   Component Value Date    TRIG 131 07/05/2023    TRIG 114 04/04/2022     Lab Results   Component Value Date    CHOLHDL 2.9 07/05/2023    CHOLHDL 3.2 04/04/2022       Lab Results   Component Value Date    WBC 5.83 07/05/2023    HGB 15.7 07/05/2023    HCT 46.5 07/05/2023    MCV 94.7 07/05/2023     (L) 07/05/2023           Current Outpatient Medications:     atorvastatin (LIPITOR) 40 MG tablet, TAKE 1 TABLET BY MOUTH EVERY DAY, Disp: 90 tablet, Rfl: 0    benzonatate (TESSALON) 100 MG capsule, Take 100 mg by mouth 3 (three) times daily as needed., Disp: , Rfl:     cyanocobalamin 1,000 mcg/mL injection, Inject 1 mL (1,000 mcg total) into the muscle every 7 days for 30 days, THEN 1 mL (1,000  "mcg total) every 30 days., Disp: 7 mL, Rfl: 0    montelukast (SINGULAIR) 10 mg tablet, TAKE 1 TABLET BY MOUTH EVERY DAY, Disp: 90 tablet, Rfl: 0    syringe with needle 1 mL 28 gauge x 1/2" Syrg, 1 Syringe by Misc.(Non-Drug; Combo Route) route every 7 days for 30 days, THEN 1 Syringe every 30 days. To be used for B 12 injections.., Disp: 7 each, Rfl: 0    tamsulosin (FLOMAX) 0.4 mg Cap, TAKE 1 CAPSULE BY MOUTH EVERY DAY, Disp: 90 capsule, Rfl: 0  Medications reviewed and reconciled.    Review of Systems   Respiratory:  Negative for shortness of breath.    Cardiovascular:  Positive for palpitations. Negative for chest pain and leg swelling.   Neurological:  Negative for loss of consciousness.           Objective:   /80 (BP Location: Left arm, Patient Position: Sitting)   Pulse 67   Ht 6' 3" (1.905 m)   Wt 108 kg (238 lb)   SpO2 95%   BMI 29.75 kg/m²     Physical Exam  Vitals reviewed.   Constitutional:       Appearance: Normal appearance.   HENT:      Head: Normocephalic and atraumatic.   Neck:      Vascular: No carotid bruit or JVD.   Cardiovascular:      Rate and Rhythm: Normal rate and regular rhythm.      Pulses: Normal pulses.           Radial pulses are 2+ on the right side and 2+ on the left side.        Dorsalis pedis pulses are 2+ on the right side and 2+ on the left side.      Heart sounds: Normal heart sounds. No murmur heard.  Pulmonary:      Effort: Pulmonary effort is normal.      Breath sounds: Normal breath sounds.   Musculoskeletal:      Right lower leg: No edema.      Left lower leg: No edema.   Skin:     General: Skin is warm and dry.   Neurological:      Mental Status: He is alert and oriented to person, place, and time.           Assessment:     1. Bradycardia  EKG 12-lead    X-Ray Chest PA And Lateral    Echo    Exercise Stress - EKG    Cardiac Monitor - 3-15 Day Adult (Cupid Only)    EKG 12-lead      2. Nonspecific abnormal electrocardiogram (ECG) (EKG)  Exercise Stress - EKG      3. " Hyperlipidemia, unspecified hyperlipidemia type        4. Gastroesophageal reflux disease, unspecified whether esophagitis present        5. B12 deficiency              Plan:   ZIO monitor  Cxr- pa/ lat  Echo  EST  F/u after tests.

## 2023-11-07 DIAGNOSIS — E53.8 VITAMIN B12 DEFICIENCY: ICD-10-CM

## 2023-11-07 PROBLEM — K21.9 GASTROESOPHAGEAL REFLUX DISEASE: Chronic | Status: ACTIVE | Noted: 2023-11-07

## 2023-11-07 PROBLEM — E78.5 HYPERLIPIDEMIA: Chronic | Status: ACTIVE | Noted: 2023-11-07

## 2023-11-07 PROBLEM — R00.1 BRADYCARDIA: Status: ACTIVE | Noted: 2023-11-07

## 2023-11-07 PROBLEM — R94.31 NONSPECIFIC ABNORMAL ELECTROCARDIOGRAM (ECG) (EKG): Status: ACTIVE | Noted: 2023-11-07

## 2023-11-07 RX ORDER — CYANOCOBALAMIN 1000 UG/ML
INJECTION, SOLUTION INTRAMUSCULAR; SUBCUTANEOUS
Qty: 7 ML | Refills: 0 | OUTPATIENT
Start: 2023-11-07 | End: 2024-03-06

## 2023-11-09 ENCOUNTER — HOSPITAL ENCOUNTER (OUTPATIENT)
Dept: RADIOLOGY | Facility: HOSPITAL | Age: 73
Discharge: HOME | End: 2023-11-09
Attending: INTERNAL MEDICINE
Payer: MEDICARE

## 2023-11-09 DIAGNOSIS — R00.1 BRADYCARDIA: ICD-10-CM

## 2023-11-09 PROCEDURE — 71046 X-RAY EXAM CHEST 2 VIEWS: CPT | Mod: 26,,, | Performed by: RADIOLOGY

## 2023-11-09 PROCEDURE — 71046 X-RAY EXAM CHEST 2 VIEWS: CPT | Mod: TC

## 2023-11-09 PROCEDURE — 71046 XR CHEST PA AND LATERAL: ICD-10-PCS | Mod: 26,,, | Performed by: RADIOLOGY

## 2023-11-22 ENCOUNTER — HOSPITAL ENCOUNTER (OUTPATIENT)
Dept: CARDIOLOGY | Facility: HOSPITAL | Age: 73
Discharge: HOME | End: 2023-11-22
Attending: INTERNAL MEDICINE
Payer: MEDICARE

## 2023-11-22 VITALS
DIASTOLIC BLOOD PRESSURE: 78 MMHG | HEART RATE: 59 BPM | HEIGHT: 75 IN | BODY MASS INDEX: 29.59 KG/M2 | WEIGHT: 238 LBS | SYSTOLIC BLOOD PRESSURE: 131 MMHG | BODY MASS INDEX: 29.75 KG/M2 | HEIGHT: 75 IN

## 2023-11-22 DIAGNOSIS — R94.31 NONSPECIFIC ABNORMAL ELECTROCARDIOGRAM (ECG) (EKG): ICD-10-CM

## 2023-11-22 DIAGNOSIS — R00.1 BRADYCARDIA: ICD-10-CM

## 2023-11-22 PROCEDURE — 93017 CV STRESS TEST TRACING ONLY: CPT

## 2023-11-22 PROCEDURE — 93018 EXERCISE STRESS - EKG (CUPID ONLY): ICD-10-PCS | Mod: ,,, | Performed by: INTERNAL MEDICINE

## 2023-11-22 PROCEDURE — 93306 ECHO (CUPID ONLY): ICD-10-PCS | Mod: 26,,, | Performed by: INTERNAL MEDICINE

## 2023-11-22 PROCEDURE — 93306 TTE W/DOPPLER COMPLETE: CPT | Mod: 26,,, | Performed by: INTERNAL MEDICINE

## 2023-11-22 PROCEDURE — 93016 CV STRESS TEST SUPVJ ONLY: CPT | Mod: ,,, | Performed by: NURSE PRACTITIONER

## 2023-11-22 PROCEDURE — 93016 EXERCISE STRESS - EKG (CUPID ONLY): ICD-10-PCS | Mod: ,,, | Performed by: NURSE PRACTITIONER

## 2023-11-22 PROCEDURE — 93018 CV STRESS TEST I&R ONLY: CPT | Mod: ,,, | Performed by: INTERNAL MEDICINE

## 2023-11-22 PROCEDURE — 93306 TTE W/DOPPLER COMPLETE: CPT

## 2023-11-27 DIAGNOSIS — E53.8 VITAMIN B12 DEFICIENCY: ICD-10-CM

## 2023-11-28 RX ORDER — CYANOCOBALAMIN 1000 UG/ML
INJECTION, SOLUTION INTRAMUSCULAR; SUBCUTANEOUS
Qty: 3 ML | Refills: 0 | Status: SHIPPED | OUTPATIENT
Start: 2023-11-28 | End: 2024-02-24

## 2023-12-06 ENCOUNTER — OFFICE VISIT (OUTPATIENT)
Dept: CARDIOLOGY | Facility: CLINIC | Age: 73
End: 2023-12-06
Payer: MEDICARE

## 2023-12-06 VITALS
DIASTOLIC BLOOD PRESSURE: 78 MMHG | BODY MASS INDEX: 29.82 KG/M2 | HEIGHT: 75 IN | HEART RATE: 55 BPM | SYSTOLIC BLOOD PRESSURE: 122 MMHG | WEIGHT: 239.81 LBS | OXYGEN SATURATION: 95 %

## 2023-12-06 DIAGNOSIS — R94.31 NONSPECIFIC ABNORMAL ELECTROCARDIOGRAM (ECG) (EKG): Chronic | ICD-10-CM

## 2023-12-06 DIAGNOSIS — K21.9 GASTROESOPHAGEAL REFLUX DISEASE, UNSPECIFIED WHETHER ESOPHAGITIS PRESENT: Chronic | ICD-10-CM

## 2023-12-06 DIAGNOSIS — E78.5 HYPERLIPIDEMIA, UNSPECIFIED HYPERLIPIDEMIA TYPE: Chronic | ICD-10-CM

## 2023-12-06 DIAGNOSIS — R00.1 BRADYCARDIA: Primary | Chronic | ICD-10-CM

## 2023-12-06 LAB
AORTIC ROOT ANNULUS: 3.34 CM
AV INDEX (PROSTH): 0.77
AV MEAN GRADIENT: 3 MMHG
AV PEAK GRADIENT: 6 MMHG
AV VALVE AREA BY VELOCITY RATIO: 3 CM²
AV VALVE AREA: 3.19 CM²
AV VELOCITY RATIO: 0.73
BSA FOR ECHO PROCEDURE: 2.39 M2
CV ECHO LV RWT: 0.17 CM
DOP CALC AO PEAK VEL: 1.25 M/S
DOP CALC AO VTI: 27.1 CM
DOP CALC LVOT AREA: 4.1 CM2
DOP CALC LVOT DIAMETER: 2.29 CM
DOP CALC LVOT PEAK VEL: 0.91 M/S
DOP CALC LVOT STROKE VOLUME: 86.45 CM3
DOP CALCLVOT PEAK VEL VTI: 21 CM
E WAVE DECELERATION TIME: 350.73 MSEC
E/A RATIO: 0.83
E/E' RATIO: 5.3 M/S
ECHO LV POSTERIOR WALL: 0.57 CM (ref 0.6–1.1)
EJECTION FRACTION: 50 %
FRACTIONAL SHORTENING: 35 % (ref 28–44)
INTERVENTRICULAR SEPTUM: 0.65 CM (ref 0.6–1.1)
LEFT INTERNAL DIMENSION IN SYSTOLE: 4.29 CM (ref 2.1–4)
LEFT VENTRICLE DIASTOLIC VOLUME INDEX: 94.34 ML/M2
LEFT VENTRICLE DIASTOLIC VOLUME: 222.65 ML
LEFT VENTRICLE MASS INDEX: 67 G/M2
LEFT VENTRICLE SYSTOLIC VOLUME INDEX: 35 ML/M2
LEFT VENTRICLE SYSTOLIC VOLUME: 82.54 ML
LEFT VENTRICULAR INTERNAL DIMENSION IN DIASTOLE: 6.59 CM (ref 3.5–6)
LEFT VENTRICULAR MASS: 158.84 G
LV LATERAL E/E' RATIO: 4.42 M/S
LV SEPTAL E/E' RATIO: 6.63 M/S
LVOT MG: 1.86 MMHG
LVOT MV: 0.65 CM/S
MV PEAK A VEL: 0.64 M/S
MV PEAK E VEL: 0.53 M/S
RA PRESSURE ESTIMATED: 3 MMHG
RIGHT ATRIAL AREA: 17 CM2
RIGHT VENTRICULAR END-DIASTOLIC DIMENSION: 3.2 CM
TDI LATERAL: 0.12 M/S
TDI SEPTAL: 0.08 M/S
TDI: 0.1 M/S
Z-SCORE OF LEFT VENTRICULAR DIMENSION IN END DIASTOLE: -4.2
Z-SCORE OF LEFT VENTRICULAR DIMENSION IN END SYSTOLE: -2.69

## 2023-12-06 PROCEDURE — 93248 PR EXT ECG, CONT, > 7 DAYS <= 15 DAYS, REVIEW W/INT: ICD-10-PCS | Mod: ,,, | Performed by: INTERNAL MEDICINE

## 2023-12-06 PROCEDURE — 99214 PR OFFICE/OUTPT VISIT, EST, LEVL IV, 30-39 MIN: ICD-10-PCS | Mod: S$PBB,,, | Performed by: INTERNAL MEDICINE

## 2023-12-06 PROCEDURE — 99213 OFFICE O/P EST LOW 20 MIN: CPT | Mod: PBBFAC | Performed by: INTERNAL MEDICINE

## 2023-12-06 PROCEDURE — 93248 EXT ECG>7D<15D REV&INTERPJ: CPT | Mod: ,,, | Performed by: INTERNAL MEDICINE

## 2023-12-06 PROCEDURE — 99214 OFFICE O/P EST MOD 30 MIN: CPT | Mod: S$PBB,,, | Performed by: INTERNAL MEDICINE

## 2023-12-07 LAB
CV STRESS BASE HR: 59 BPM
DIASTOLIC BLOOD PRESSURE: 78 MMHG
OHS CV CPX 1 MINUTE RECOVERY HEART RATE: 109 BPM
OHS CV CPX 85 PERCENT MAX PREDICTED HEART RATE MALE: 125
OHS CV CPX MAX PREDICTED HEART RATE: 147
OHS CV CPX PATIENT IS FEMALE: 0
OHS CV CPX PATIENT IS MALE: 1
OHS CV CPX PEAK DIASTOLIC BLOOD PRESSURE: 59 MMHG
OHS CV CPX PEAK HEAR RATE: 145 BPM
OHS CV CPX PEAK RATE PRESSURE PRODUCT: NORMAL
OHS CV CPX PEAK SYSTOLIC BLOOD PRESSURE: 163 MMHG
OHS CV CPX PERCENT MAX PREDICTED HEART RATE ACHIEVED: 99
OHS CV CPX RATE PRESSURE PRODUCT PRESENTING: 7729
STRESS ECHO POST EXERCISE DUR MIN: 6 MINUTES
STRESS ECHO POST EXERCISE DUR SEC: 25 SECONDS
SYSTOLIC BLOOD PRESSURE: 131 MMHG

## 2023-12-13 NOTE — PROGRESS NOTES
PCP: Antonia Jeffries FNP    Referring Provider:     Subjective:   Phuc Thorne is a 73 y.o. male with hx of HLD, GERD, B12 deficiency, and VEENA who presents for test results.     11/2/23--Phuc Thorne is a 73 y.o. male with hx of HLD, GERD, B12 deficiency, and VEENA who presents for c/o bradycardia. Does yard work without problems, no SOB.     Fhx:  Family History   Problem Relation Age of Onset    Aortic aneurysm Paternal Grandfather      Shx:   Social History     Socioeconomic History    Marital status:    Tobacco Use    Smoking status: Former     Passive exposure: Past    Smokeless tobacco: Never   Substance and Sexual Activity    Alcohol use: Not Currently    Drug use: Never     Social Determinants of Health     Financial Resource Strain: Low Risk  (11/20/2023)    Overall Financial Resource Strain (CARDIA)     Difficulty of Paying Living Expenses: Not hard at all   Food Insecurity: No Food Insecurity (11/20/2023)    Hunger Vital Sign     Worried About Running Out of Food in the Last Year: Never true     Ran Out of Food in the Last Year: Never true   Transportation Needs: No Transportation Needs (11/20/2023)    PRAPARE - Transportation     Lack of Transportation (Medical): No     Lack of Transportation (Non-Medical): No   Physical Activity: Insufficiently Active (11/20/2023)    Exercise Vital Sign     Days of Exercise per Week: 2 days     Minutes of Exercise per Session: 20 min   Stress: Stress Concern Present (11/20/2023)    [employer] - Occupational Stress Questionnaire     Feeling of Stress : To some extent   Social Connections: Unknown (11/20/2023)    Social Connection and Isolation Panel [NHANES]     Frequency of Communication with Friends and Family: Once a week     Frequency of Social Gatherings with Friends and Family: Once a week     Active Member of Clubs or Organizations: Yes     Attends Club or Organization Meetings: More than 4 times per year     Marital Status:     Housing Stability: Low Risk  (11/20/2023)    Housing Stability Vital Sign     Unable to Pay for Housing in the Last Year: No     Number of Places Lived in the Last Year: 1     Unstable Housing in the Last Year: No       EKG   11/2/23--NSR, minimal voltage criteria for LVH, 66 bpm    Zio:  11/2/23-11/16/23--Basic rhythm is sinus, avg HR 57 bpm. Rare PAC's, couplets, and triplets. Occasional PVC's, rare couplets. 30.8 second run of trigeminy. 7 pt triggered events during NSR, V trigeminym, PAC's, and PVC's.     ECHO and EST in 2016- records not available    Echo    11/22/23--Interpretation Summary    Left Ventricle: The left ventricle is normal in size. Normal wall thickness. Mild global hyperkinesis present. There is low normal systolic function. Ejection fraction by visual approximation is 50%.    Right Ventricle: Mild right ventricular enlargement.    Left Atrium: Left atrium is mildly dilated.    Aortic Valve: The aortic valve is a trileaflet valve.    Mitral Valve: There is mild to moderate regurgitation.    Pulmonic Valve: There is mild regurgitation.    IVC/SVC: Normal venous pressure at 3 mmHg.    EST:  11/22/23--Negative EST.       Lab Results   Component Value Date     07/05/2023    K 4.4 07/05/2023     (H) 07/05/2023    CO2 26 07/05/2023    BUN 11 07/05/2023    CREATININE 0.80 07/05/2023    CALCIUM 9.2 07/05/2023    ANIONGAP 12 07/05/2023    ESTGFRAFRICA 105 12/15/2020    EGFRNONAA 104 04/04/2022       Lab Results   Component Value Date    CHOL 138 07/05/2023    CHOL 140 04/04/2022     Lab Results   Component Value Date    HDL 47 07/05/2023    HDL 44 04/04/2022     Lab Results   Component Value Date    LDLCALC 65 07/05/2023    LDLCALC 73 04/04/2022     Lab Results   Component Value Date    TRIG 131 07/05/2023    TRIG 114 04/04/2022     Lab Results   Component Value Date    CHOLHDL 2.9 07/05/2023    CHOLHDL 3.2 04/04/2022       Lab Results   Component Value Date    WBC 5.83 07/05/2023  "   HGB 15.7 07/05/2023    HCT 46.5 07/05/2023    MCV 94.7 07/05/2023     (L) 07/05/2023           Current Outpatient Medications:     atorvastatin (LIPITOR) 40 MG tablet, TAKE 1 TABLET BY MOUTH EVERY DAY, Disp: 90 tablet, Rfl: 0    benzonatate (TESSALON) 100 MG capsule, Take 100 mg by mouth 3 (three) times daily as needed., Disp: , Rfl:     cyanocobalamin 1,000 mcg/mL injection, Inject 1 mL (1,000 mcg total) into the muscle every 30 days for 30 days, THEN 1 mL (1,000 mcg total) every 30 days., Disp: 3 mL, Rfl: 0    montelukast (SINGULAIR) 10 mg tablet, TAKE 1 TABLET BY MOUTH EVERY DAY, Disp: 90 tablet, Rfl: 0    tamsulosin (FLOMAX) 0.4 mg Cap, TAKE 1 CAPSULE BY MOUTH EVERY DAY, Disp: 90 capsule, Rfl: 0  Medications reviewed and reconciled.    Review of Systems   Respiratory:  Negative for shortness of breath.    Cardiovascular:  Negative for chest pain, palpitations and leg swelling.   Neurological:  Negative for loss of consciousness.           Objective:   /78 (BP Location: Left arm, Patient Position: Sitting)   Pulse (!) 55   Ht 6' 3" (1.905 m)   Wt 108.8 kg (239 lb 12.8 oz)   SpO2 95%   BMI 29.97 kg/m²     Physical Exam  Vitals reviewed.   Constitutional:       Appearance: Normal appearance.   HENT:      Head: Normocephalic and atraumatic.   Neck:      Vascular: No carotid bruit or JVD.   Cardiovascular:      Rate and Rhythm: Normal rate and regular rhythm.      Pulses: Normal pulses.           Radial pulses are 2+ on the right side and 2+ on the left side.        Dorsalis pedis pulses are 2+ on the right side and 2+ on the left side.      Heart sounds: Normal heart sounds. No murmur heard.  Pulmonary:      Effort: Pulmonary effort is normal.      Breath sounds: Normal breath sounds.   Musculoskeletal:      Right lower leg: No edema.      Left lower leg: No edema.   Skin:     General: Skin is warm and dry.   Neurological:      Mental Status: He is alert and oriented to person, place, and time. "           Assessment:     1. Bradycardia        2. Hyperlipidemia, unspecified hyperlipidemia type        3. Nonspecific abnormal electrocardiogram (ECG) (EKG)        4. Gastroesophageal reflux disease, unspecified whether esophagitis present              Plan:   Repeat echo in 6 months  F/u in 6 months.

## 2023-12-28 RX ORDER — ATORVASTATIN CALCIUM 40 MG/1
TABLET, FILM COATED ORAL
Qty: 90 TABLET | Refills: 0 | Status: SHIPPED | OUTPATIENT
Start: 2023-12-28

## 2023-12-28 RX ORDER — TAMSULOSIN HYDROCHLORIDE 0.4 MG/1
CAPSULE ORAL
Qty: 90 CAPSULE | Refills: 0 | Status: SHIPPED | OUTPATIENT
Start: 2023-12-28

## 2023-12-28 RX ORDER — MONTELUKAST SODIUM 10 MG/1
TABLET ORAL
Qty: 90 TABLET | Refills: 0 | Status: SHIPPED | OUTPATIENT
Start: 2023-12-28

## 2024-02-23 DIAGNOSIS — E53.8 VITAMIN B12 DEFICIENCY: ICD-10-CM

## 2024-02-24 RX ORDER — CYANOCOBALAMIN 1000 UG/ML
INJECTION, SOLUTION INTRAMUSCULAR; SUBCUTANEOUS
Qty: 3 ML | Refills: 0 | Status: SHIPPED | OUTPATIENT
Start: 2024-02-24 | End: 2024-02-29

## 2024-02-25 DIAGNOSIS — E53.8 VITAMIN B12 DEFICIENCY: ICD-10-CM

## 2024-02-29 RX ORDER — CYANOCOBALAMIN 1000 UG/ML
1000 INJECTION, SOLUTION INTRAMUSCULAR; SUBCUTANEOUS
Qty: 3 ML | Refills: 0 | Status: SHIPPED | OUTPATIENT
Start: 2024-02-29

## 2024-04-09 DIAGNOSIS — Z71.89 COMPLEX CARE COORDINATION: ICD-10-CM

## 2024-05-01 ENCOUNTER — OFFICE VISIT (OUTPATIENT)
Dept: FAMILY MEDICINE | Facility: CLINIC | Age: 74
End: 2024-05-01
Payer: MEDICARE

## 2024-05-01 VITALS
OXYGEN SATURATION: 98 % | HEART RATE: 51 BPM | SYSTOLIC BLOOD PRESSURE: 142 MMHG | TEMPERATURE: 98 F | WEIGHT: 240 LBS | DIASTOLIC BLOOD PRESSURE: 86 MMHG | RESPIRATION RATE: 17 BRPM | HEIGHT: 75 IN | BODY MASS INDEX: 29.84 KG/M2

## 2024-05-01 DIAGNOSIS — Z13.6 SCREENING FOR AAA (AORTIC ABDOMINAL ANEURYSM): ICD-10-CM

## 2024-05-01 DIAGNOSIS — E11.9 TYPE 2 DIABETES MELLITUS WITHOUT COMPLICATION, WITHOUT LONG-TERM CURRENT USE OF INSULIN: Primary | ICD-10-CM

## 2024-05-01 DIAGNOSIS — J30.2 SEASONAL ALLERGIES: ICD-10-CM

## 2024-05-01 DIAGNOSIS — Z11.59 NEED FOR HEPATITIS C SCREENING TEST: ICD-10-CM

## 2024-05-01 DIAGNOSIS — E53.8 VITAMIN B12 DEFICIENCY: ICD-10-CM

## 2024-05-01 DIAGNOSIS — N40.0 BENIGN PROSTATIC HYPERPLASIA, UNSPECIFIED WHETHER LOWER URINARY TRACT SYMPTOMS PRESENT: ICD-10-CM

## 2024-05-01 DIAGNOSIS — E78.5 HYPERLIPIDEMIA, UNSPECIFIED HYPERLIPIDEMIA TYPE: ICD-10-CM

## 2024-05-01 DIAGNOSIS — L98.9 SKIN LESION OF CHEEK: ICD-10-CM

## 2024-05-01 DIAGNOSIS — D69.6 THROMBOCYTOPENIA: ICD-10-CM

## 2024-05-01 PROBLEM — R10.9 ABDOMINAL PAIN: Status: RESOLVED | Noted: 2021-06-16 | Resolved: 2024-05-01

## 2024-05-01 PROBLEM — K59.00 CONSTIPATION: Status: RESOLVED | Noted: 2021-06-16 | Resolved: 2024-05-01

## 2024-05-01 PROBLEM — R19.4 RECENT CHANGE IN FREQUENCY OF BOWEL MOVEMENTS: Status: RESOLVED | Noted: 2021-06-16 | Resolved: 2024-05-01

## 2024-05-01 PROBLEM — R19.7 DIARRHEA: Status: RESOLVED | Noted: 2021-09-16 | Resolved: 2024-05-01

## 2024-05-01 LAB
ALBUMIN SERPL BCP-MCNC: 3.9 G/DL (ref 3.5–5)
ALBUMIN/GLOB SERPL: 1.1 {RATIO}
ALP SERPL-CCNC: 111 U/L (ref 45–115)
ALT SERPL W P-5'-P-CCNC: 38 U/L (ref 16–61)
ANION GAP SERPL CALCULATED.3IONS-SCNC: 12 MMOL/L (ref 7–16)
AST SERPL W P-5'-P-CCNC: 33 U/L (ref 15–37)
BASOPHILS # BLD AUTO: 0.04 K/UL (ref 0–0.2)
BASOPHILS NFR BLD AUTO: 0.7 % (ref 0–1)
BILIRUB SERPL-MCNC: 1.3 MG/DL (ref ?–1.2)
BUN SERPL-MCNC: 12 MG/DL (ref 7–18)
BUN/CREAT SERPL: 15 (ref 6–20)
CALCIUM SERPL-MCNC: 9.3 MG/DL (ref 8.5–10.1)
CHLORIDE SERPL-SCNC: 107 MMOL/L (ref 98–107)
CHOLEST SERPL-MCNC: 140 MG/DL (ref 0–200)
CHOLEST/HDLC SERPL: 2.9 {RATIO}
CO2 SERPL-SCNC: 25 MMOL/L (ref 21–32)
CREAT SERPL-MCNC: 0.8 MG/DL (ref 0.7–1.3)
CREAT UR-MCNC: 114 MG/DL (ref 39–259)
DIFFERENTIAL METHOD BLD: ABNORMAL
EGFR (NO RACE VARIABLE) (RUSH/TITUS): 93 ML/MIN/1.73M2
EOSINOPHIL # BLD AUTO: 0.06 K/UL (ref 0–0.5)
EOSINOPHIL NFR BLD AUTO: 1.1 % (ref 1–4)
ERYTHROCYTE [DISTWIDTH] IN BLOOD BY AUTOMATED COUNT: 12.7 % (ref 11.5–14.5)
FOLATE SERPL-MCNC: 19.4 NG/ML (ref 3.1–17.5)
GLOBULIN SER-MCNC: 3.4 G/DL (ref 2–4)
GLUCOSE SERPL-MCNC: 118 MG/DL (ref 74–106)
HCT VFR BLD AUTO: 46.5 % (ref 40–54)
HCV AB SER QL: NORMAL
HDLC SERPL-MCNC: 49 MG/DL (ref 40–60)
HGB BLD-MCNC: 15.5 G/DL (ref 13.5–18)
IMM GRANULOCYTES # BLD AUTO: 0.01 K/UL (ref 0–0.04)
IMM GRANULOCYTES NFR BLD: 0.2 % (ref 0–0.4)
LDLC SERPL CALC-MCNC: 67 MG/DL
LDLC/HDLC SERPL: 1.4 {RATIO}
LYMPHOCYTES # BLD AUTO: 1.94 K/UL (ref 1–4.8)
LYMPHOCYTES NFR BLD AUTO: 34.2 % (ref 27–41)
MCH RBC QN AUTO: 32.3 PG (ref 27–31)
MCHC RBC AUTO-ENTMCNC: 33.3 G/DL (ref 32–36)
MCV RBC AUTO: 96.9 FL (ref 80–96)
MICROALBUMIN UR-MCNC: 1.6 MG/DL (ref 0–2.8)
MICROALBUMIN/CREAT RATIO PNL UR: 14 MG/G (ref 0–30)
MONOCYTES # BLD AUTO: 0.36 K/UL (ref 0–0.8)
MONOCYTES NFR BLD AUTO: 6.3 % (ref 2–6)
MPC BLD CALC-MCNC: 12.4 FL (ref 9.4–12.4)
NEUTROPHILS # BLD AUTO: 3.27 K/UL (ref 1.8–7.7)
NEUTROPHILS NFR BLD AUTO: 57.5 % (ref 53–65)
NONHDLC SERPL-MCNC: 91 MG/DL
NRBC # BLD AUTO: 0 X10E3/UL
NRBC, AUTO (.00): 0 %
PLATELET # BLD AUTO: 149 K/UL (ref 150–400)
POTASSIUM SERPL-SCNC: 4.1 MMOL/L (ref 3.5–5.1)
PROT SERPL-MCNC: 7.3 G/DL (ref 6.4–8.2)
RBC # BLD AUTO: 4.8 M/UL (ref 4.6–6.2)
SODIUM SERPL-SCNC: 140 MMOL/L (ref 136–145)
TRIGL SERPL-MCNC: 119 MG/DL (ref 35–150)
VIT B12 SERPL-MCNC: 343 PG/ML (ref 193–986)
VLDLC SERPL-MCNC: 24 MG/DL
WBC # BLD AUTO: 5.68 K/UL (ref 4.5–11)

## 2024-05-01 PROCEDURE — 82043 UR ALBUMIN QUANTITATIVE: CPT | Mod: ,,, | Performed by: CLINICAL MEDICAL LABORATORY

## 2024-05-01 PROCEDURE — 99214 OFFICE O/P EST MOD 30 MIN: CPT | Mod: ,,, | Performed by: NURSE PRACTITIONER

## 2024-05-01 PROCEDURE — 85025 COMPLETE CBC W/AUTO DIFF WBC: CPT | Mod: ,,, | Performed by: CLINICAL MEDICAL LABORATORY

## 2024-05-01 PROCEDURE — 86803 HEPATITIS C AB TEST: CPT | Mod: ,,, | Performed by: CLINICAL MEDICAL LABORATORY

## 2024-05-01 PROCEDURE — 80053 COMPREHEN METABOLIC PANEL: CPT | Mod: ,,, | Performed by: CLINICAL MEDICAL LABORATORY

## 2024-05-01 PROCEDURE — 82570 ASSAY OF URINE CREATININE: CPT | Mod: ,,, | Performed by: CLINICAL MEDICAL LABORATORY

## 2024-05-01 PROCEDURE — 82607 VITAMIN B-12: CPT | Mod: ,,, | Performed by: CLINICAL MEDICAL LABORATORY

## 2024-05-01 PROCEDURE — 82746 ASSAY OF FOLIC ACID SERUM: CPT | Mod: ,,, | Performed by: CLINICAL MEDICAL LABORATORY

## 2024-05-01 PROCEDURE — 80061 LIPID PANEL: CPT | Mod: ,,, | Performed by: CLINICAL MEDICAL LABORATORY

## 2024-05-01 PROCEDURE — 83036 HEMOGLOBIN GLYCOSYLATED A1C: CPT | Mod: ,,, | Performed by: CLINICAL MEDICAL LABORATORY

## 2024-05-01 RX ORDER — TAMSULOSIN HYDROCHLORIDE 0.4 MG/1
1 CAPSULE ORAL DAILY
Qty: 90 CAPSULE | Refills: 1 | Status: SHIPPED | OUTPATIENT
Start: 2024-05-01

## 2024-05-01 RX ORDER — CYANOCOBALAMIN 1000 UG/ML
1000 INJECTION, SOLUTION INTRAMUSCULAR; SUBCUTANEOUS
Qty: 3 ML | Refills: 0 | Status: CANCELLED | OUTPATIENT
Start: 2024-05-01

## 2024-05-01 RX ORDER — MONTELUKAST SODIUM 10 MG/1
10 TABLET ORAL DAILY
Qty: 90 TABLET | Refills: 1 | Status: SHIPPED | OUTPATIENT
Start: 2024-05-01

## 2024-05-01 RX ORDER — ATORVASTATIN CALCIUM 40 MG/1
40 TABLET, FILM COATED ORAL DAILY
Qty: 90 TABLET | Refills: 1 | Status: SHIPPED | OUTPATIENT
Start: 2024-05-01

## 2024-05-01 RX ORDER — FLUTICASONE PROPIONATE 50 MCG
1 SPRAY, SUSPENSION (ML) NASAL DAILY
COMMUNITY
Start: 2024-04-18

## 2024-05-01 NOTE — PROGRESS NOTES
[facility]    Chief Complaint      Chief Complaint   Patient presents with    Hyperlipidemia     Reports he is NPO, medication refills today     Facial Burning     Reports more common after a day being out in the sun, reports redness to face with glowing, would like thyroid lab check, he denies fatigue at this time    Health Maintenance     Defers shingles and rsv vaccine, has had 2 covid vaccines at health department       History of Present Illness      Phuc Thorne is a 73 y.o. male. He  has a past medical history of Cobalamin deficiency, GERD (gastroesophageal reflux disease), High cholesterol, and Sleep apnea., who presents today for f/u with lab work and medication refills.  He states he thinks he may need his Thyroid checked due to some recent flushing.  He does admit though that he does have Rosacea as well and patient is taking B12 injections.     He also has a place under his R eye that he states bleeds occasionally and he had the same thing about 10- 11yrs ago removed from under his L eye that was precancerous.  States at that time he saw Dr. [facility] who referred him to Dr. Paul for the removal.     Past Medical History:  Past Medical History:   Diagnosis Date    Cobalamin deficiency     GERD (gastroesophageal reflux disease)     High cholesterol     Sleep apnea        Past Surgical History:   has a past surgical history that includes Shoulder surgery (Right); Neck surgery; Colonoscopy (); Hip surgery; Hand surgery; Hernia repair; Cholecystectomy (2020); Colonoscopy (2017); Colonoscopy (2021); and Cholecystectomy (2020).    Social History:  Social History     Tobacco Use    Smoking status: Former     Current packs/day: 0.00     Average packs/day: 1 pack/day for 6.0 years (6.0 ttl pk-yrs)     Types: Cigarettes     Start date:      Quit date:      Years since quittin.3     Passive exposure: Past    Smokeless tobacco: Never   Substance Use Topics     Alcohol use: Not Currently    Drug use: Never       I personally reviewed all past medical, surgical, and social.     Review of Systems   Constitutional:  Negative for fatigue and unexpected weight change.   HENT:  Negative for tinnitus and trouble swallowing.    Eyes:  Negative for visual disturbance.   Respiratory:  Negative for shortness of breath.    Cardiovascular: Negative.    Gastrointestinal:  Negative for abdominal pain, constipation, diarrhea, nausea and vomiting.   Genitourinary:  Negative for difficulty urinating.   Musculoskeletal:  Negative for gait problem.   Skin: Negative.         Lesion to R upper cheek  Flushing of skin   Neurological:  Negative for dizziness, light-headedness and headaches.        Medications:  Outpatient Encounter Medications as of 5/1/2024   Medication Sig Dispense Refill    cyanocobalamin 1,000 mcg/mL injection Inject 1 mL (1,000 mcg total) into the muscle every 30 days. 3 mL 0    fluticasone propionate (FLONASE) 50 mcg/actuation nasal spray 1 spray by Each Nostril route once daily.      [DISCONTINUED] atorvastatin (LIPITOR) 40 MG tablet TAKE 1 TABLET BY MOUTH EVERY DAY 90 tablet 0    [DISCONTINUED] montelukast (SINGULAIR) 10 mg tablet TAKE 1 TABLET BY MOUTH EVERY DAY 90 tablet 0    [DISCONTINUED] tamsulosin (FLOMAX) 0.4 mg Cap TAKE 1 CAPSULE BY MOUTH EVERY DAY 90 capsule 0    atorvastatin (LIPITOR) 40 MG tablet Take 1 tablet (40 mg total) by mouth once daily. 90 tablet 1    benzonatate (TESSALON) 100 MG capsule Take 100 mg by mouth 3 (three) times daily as needed. (Patient not taking: Reported on 5/1/2024)      montelukast (SINGULAIR) 10 mg tablet Take 1 tablet (10 mg total) by mouth once daily. 90 tablet 1    tamsulosin (FLOMAX) 0.4 mg Cap Take 1 capsule (0.4 mg total) by mouth once daily. 90 capsule 1     No facility-administered encounter medications on file as of 5/1/2024.       Allergies:  Review of patient's allergies indicates:   Allergen Reactions    Codeine Other  "(See Comments)    Opioids - morphine analogues     Shrimp        Health Maintenance:  Immunization History   Administered Date(s) Administered    COVID-19 MRNA, LN-S PF (MODERNA HALF 0.25 ML DOSE) 11/11/2021    COVID-19, MRNA, LN-S, PF (MODERNA FULL 0.5 ML DOSE) 01/19/2021, 02/21/2021    Influenza (FLUAD) - Quadrivalent - Adjuvanted - PF *Preferred* (65+) 10/25/2022, 10/16/2023    Influenza - High Dose - PF (65 years and older) 11/05/2018, 11/11/2019, 10/06/2020    Influenza - Quadrivalent - High Dose - PF (65 years and older) 11/02/2021    Influenza - Quadrivalent - PF *Preferred* (6 months and older) 11/01/2017    Pneumococcal Conjugate - 13 Valent 11/05/2018    Pneumococcal Conjugate - 20 Valent 10/16/2023    Pneumococcal Polysaccharide - 23 Valent 11/01/2017    Tdap 07/27/2017      Health Maintenance   Topic Date Due    Hepatitis C Screening  Never done    Shingles Vaccine (1 of 2) Never done    Abdominal Aortic Aneurysm Screening  Never done    Hemoglobin A1c  04/16/2024    Lipid Panel  07/05/2024    Eye Exam  12/28/2024    Low Dose Statin  05/01/2025    Foot Exam  05/01/2025    Colorectal Cancer Screening  03/19/2026    TETANUS VACCINE  07/27/2027        Physical Exam      Vital Signs  Temp: 98 °F (36.7 °C)  Temp Source: Oral  Pulse: (!) 51  Resp: 17  SpO2: 98 %  BP: (!) 142/86  BP Location: Right arm  Patient Position: Sitting  Pain Score: 0-No pain  Height and Weight  Height: 6' 3" (190.5 cm)  Weight: 108.9 kg (240 lb)  BSA (Calculated - sq m): 2.4 sq meters  BMI (Calculated): 30  Weight in (lb) to have BMI = 25: 199.6]    Physical Exam  Vitals and nursing note reviewed.   Constitutional:       Appearance: Normal appearance.   HENT:      Head: Normocephalic.      Right Ear: Hearing normal.      Left Ear: Hearing normal.      Nose: Nose normal.   Eyes:      General: Lids are normal.      Conjunctiva/sclera: Conjunctivae normal.   Neck:      Thyroid: No thyromegaly.   Cardiovascular:      Rate and Rhythm: " Regular rhythm. Bradycardia present.      Heart sounds: Normal heart sounds.   Pulmonary:      Effort: Pulmonary effort is normal.      Breath sounds: Normal breath sounds.   Musculoskeletal:         General: Normal range of motion.      Cervical back: Normal range of motion and neck supple.      Right lower leg: No edema.      Left lower leg: No edema.   Skin:     General: Skin is warm and dry.      Comments: Small raised area below R eye on his upper cheek   Neurological:      General: No focal deficit present.      Mental Status: He is alert and oriented to person, place, and time.      Gait: Gait is intact.          Laboratory:  CBC:  Recent Labs   Lab 06/16/21  1107 04/04/22  0932 07/05/23  0843   WBC 5.98 6.35 5.83   RBC 4.42 L 4.89 4.91   Hemoglobin 13.8 15.9 15.7   Hematocrit 42.2 48.4 46.5   Platelet Count 150 167 145 L   MCV 95.5 99.0 H 94.7   MCH 31.2 H 32.5 H 32.0 H   MCHC 32.7 32.9 33.8     CMP:  Recent Labs   Lab 06/16/21  1107 04/04/22  0932 07/05/23  0843   Glucose 96 99 145 H   Calcium 8.8 9.2 9.2   Albumin 3.8 3.9 3.8   Total Protein 7.4 7.4 7.3   Sodium 142 141 142   Potassium 4.0 4.7 4.4   CO2 31 30 26   Chloride 108 H 105 108 H   BUN 11 11 11   Alk Phos 117 H 116 H 106   ALT 38 35 46   AST 32 26 41 H   Bilirubin, Total 1.0 0.8 0.6     LIPIDS:  Recent Labs   Lab 04/04/22  0932 07/05/23  0843   TSH  --  1.520   HDL Cholesterol 44 47   Cholesterol 140 138   Triglycerides 114 131   LDL Calculated 73 65   Cholesterol/HDL Ratio (Risk Factor) 3.2 2.9   Non-HDL 96 91     TSH:  Recent Labs   Lab 07/05/23  0843   TSH 1.520     A1C:  Recent Labs   Lab 07/05/23  0843 10/16/23  0902   Hemoglobin A1C 7.0 H 6.4       Assessment/Plan     Phuc Thorne is a 73 y.o.male with:     1. Type 2 diabetes mellitus without complication, without long-term current use of insulin  -     Microalbumin/Creatinine Ratio, Urine  -     Hemoglobin A1C; Future; Expected date: 05/01/2024    2. Need for hepatitis C screening test  -      Hepatitis C Antibody; Future; Expected date: 05/01/2024    3. Screening for AAA (aortic abdominal aneurysm)  -     US AAA Screening; Future; Expected date: 05/01/2024    4. Hyperlipidemia, unspecified hyperlipidemia type  -     CBC Auto Differential; Future; Expected date: 05/01/2024  -     Comprehensive Metabolic Panel; Future; Expected date: 05/01/2024  -     Lipid Panel; Future; Expected date: 05/01/2024  -     atorvastatin (LIPITOR) 40 MG tablet; Take 1 tablet (40 mg total) by mouth once daily.  Dispense: 90 tablet; Refill: 1    5. Vitamin B12 deficiency  -     Vitamin B12 & Folate; Future; Expected date: 05/01/2024    6. Benign prostatic hyperplasia, unspecified whether lower urinary tract symptoms present  -     tamsulosin (FLOMAX) 0.4 mg Cap; Take 1 capsule (0.4 mg total) by mouth once daily.  Dispense: 90 capsule; Refill: 1    7. Seasonal allergies  -     montelukast (SINGULAIR) 10 mg tablet; Take 1 tablet (10 mg total) by mouth once daily.  Dispense: 90 tablet; Refill: 1    8. Thrombocytopenia    9. Skin lesion of cheek  -     Ambulatory referral/consult to Dermatology; Future; Expected date: 05/08/2024       Patient had normal TSH in July last year  When talking with patient about his B12 he does state that the flushing episode did occur shortly after he took an injection and admits this may have been the reason  Will send to Dermatology to have skin lesion examined- patient states he had another lesion removed 10 yrs ago and was precancerous.   Will call with lab results    Total time spent face-to-face and non-face-to-face coordinating care for this encounter was: 30 minutes     Chronic conditions status updated as per HPI.  Other than changes above, cont current medications and maintain follow up with specialists.  Return to clinic in 6 month(s).    JOSE Falcon  [facility]

## 2024-05-02 LAB
EST. AVERAGE GLUCOSE BLD GHB EST-MCNC: 151 MG/DL
HBA1C MFR BLD HPLC: 6.9 % (ref 4.5–6.6)

## 2024-05-09 ENCOUNTER — HOSPITAL ENCOUNTER (OUTPATIENT)
Dept: RADIOLOGY | Facility: HOSPITAL | Age: 74
Discharge: HOME | End: 2024-05-09
Attending: NURSE PRACTITIONER
Payer: MEDICARE

## 2024-05-09 DIAGNOSIS — Z13.6 SCREENING FOR AAA (AORTIC ABDOMINAL ANEURYSM): ICD-10-CM

## 2024-05-09 PROCEDURE — 76706 US ABDL AORTA SCREEN AAA: CPT | Mod: 26,,, | Performed by: STUDENT IN AN ORGANIZED HEALTH CARE EDUCATION/TRAINING PROGRAM

## 2024-05-09 PROCEDURE — 76706 US ABDL AORTA SCREEN AAA: CPT | Mod: TC

## 2024-05-10 ENCOUNTER — TELEPHONE (OUTPATIENT)
Dept: FAMILY MEDICINE | Facility: CLINIC | Age: 74
End: 2024-05-10
Payer: MEDICARE

## 2024-05-10 NOTE — TELEPHONE ENCOUNTER
Patient notified that Patient's labs are all good- A1c is 6.9%; B12 is WNL , patient verbalized understanding.

## 2024-05-10 NOTE — TELEPHONE ENCOUNTER
----- Message from JOSE Falcon sent at 5/2/2024  8:56 AM CDT -----  Patient's labs are all good- A1c is 6.9%; B12 is WNL

## 2024-06-17 ENCOUNTER — OFFICE VISIT (OUTPATIENT)
Dept: CARDIOLOGY | Facility: CLINIC | Age: 74
End: 2024-06-17
Payer: MEDICARE

## 2024-06-17 VITALS
DIASTOLIC BLOOD PRESSURE: 70 MMHG | SYSTOLIC BLOOD PRESSURE: 122 MMHG | HEART RATE: 62 BPM | BODY MASS INDEX: 29.94 KG/M2 | HEIGHT: 75 IN | OXYGEN SATURATION: 94 % | WEIGHT: 240.81 LBS

## 2024-06-17 DIAGNOSIS — E78.5 HYPERLIPIDEMIA, UNSPECIFIED HYPERLIPIDEMIA TYPE: Primary | Chronic | ICD-10-CM

## 2024-06-17 DIAGNOSIS — R94.31 NONSPECIFIC ABNORMAL ELECTROCARDIOGRAM (ECG) (EKG): Primary | ICD-10-CM

## 2024-06-17 DIAGNOSIS — R94.31 NONSPECIFIC ABNORMAL ELECTROCARDIOGRAM (ECG) (EKG): ICD-10-CM

## 2024-06-17 DIAGNOSIS — K21.9 GASTROESOPHAGEAL REFLUX DISEASE, UNSPECIFIED WHETHER ESOPHAGITIS PRESENT: Chronic | ICD-10-CM

## 2024-06-17 DIAGNOSIS — G47.33 OSA ON CPAP: Chronic | ICD-10-CM

## 2024-06-17 DIAGNOSIS — E53.8 B12 DEFICIENCY: Chronic | ICD-10-CM

## 2024-06-17 PROCEDURE — 99213 OFFICE O/P EST LOW 20 MIN: CPT | Mod: PBBFAC,25 | Performed by: INTERNAL MEDICINE

## 2024-06-17 PROCEDURE — 93005 ELECTROCARDIOGRAM TRACING: CPT | Mod: PBBFAC | Performed by: INTERNAL MEDICINE

## 2024-06-17 PROCEDURE — 99214 OFFICE O/P EST MOD 30 MIN: CPT | Mod: S$PBB,,, | Performed by: INTERNAL MEDICINE

## 2024-06-17 PROCEDURE — 99999 PR PBB SHADOW E&M-EST. PATIENT-LVL III: CPT | Mod: PBBFAC,,, | Performed by: INTERNAL MEDICINE

## 2024-06-17 PROCEDURE — 93010 ELECTROCARDIOGRAM REPORT: CPT | Mod: S$PBB,,, | Performed by: INTERNAL MEDICINE

## 2024-06-18 LAB
OHS QRS DURATION: 86 MS
OHS QTC CALCULATION: 441 MS

## 2024-07-01 PROBLEM — G47.33 OSA ON CPAP: Chronic | Status: ACTIVE | Noted: 2024-07-01

## 2024-07-01 NOTE — PROGRESS NOTES
PCP: Antonia Jeffries FNP    Referring Provider:     Subjective:   Phuc Thorne is a 73 y.o. male with hx of HLD, GERD, B12 deficiency, and VEENA who presents for 6 month follow up.     23--Phuc Thorne is a 73 y.o. male with hx of HLD, GERD, B12 deficiency, and VEENA who presents for test results.     23--Phuc Thorne is a 73 y.o. male with hx of HLD, GERD, B12 deficiency, and VEENA who presents for c/o bradycardia. Does yard work without problems, no SOB.     Fhx:  Family History   Problem Relation Name Age of Onset    Aortic aneurysm Paternal Grandfather       Shx:   Social History     Socioeconomic History    Marital status:    Tobacco Use    Smoking status: Former     Current packs/day: 0.00     Average packs/day: 1 pack/day for 6.0 years (6.0 ttl pk-yrs)     Types: Cigarettes     Start date:      Quit date:      Years since quittin.5     Passive exposure: Past    Smokeless tobacco: Never   Substance and Sexual Activity    Alcohol use: Not Currently    Drug use: Never     Social Determinants of Health     Financial Resource Strain: Low Risk  (2023)    Overall Financial Resource Strain (CARDIA)     Difficulty of Paying Living Expenses: Not hard at all   Food Insecurity: No Food Insecurity (2023)    Hunger Vital Sign     Worried About Running Out of Food in the Last Year: Never true     Ran Out of Food in the Last Year: Never true   Transportation Needs: No Transportation Needs (2023)    PRAPARE - Transportation     Lack of Transportation (Medical): No     Lack of Transportation (Non-Medical): No   Physical Activity: Insufficiently Active (2023)    Exercise Vital Sign     Days of Exercise per Week: 2 days     Minutes of Exercise per Session: 20 min   Stress: Stress Concern Present (2023)    [employer] - Occupational Stress Questionnaire     Feeling of Stress : To some extent   Housing Stability: Low Risk  (2023)    Housing  Stability Vital Sign     Unable to Pay for Housing in the Last Year: No     Number of Places Lived in the Last Year: 1     Unstable Housing in the Last Year: No       EKG   6/17/24--sinus bradycardia, rightward axis, abnormal QRS-T angle, 55 bpm  11/2/23--NSR, minimal voltage criteria for LVH, 66 bpm    Zio:  11/2/23-11/16/23--Basic rhythm is sinus, avg HR 57 bpm. Rare PAC's, couplets, and triplets. Occasional PVC's, rare couplets. 30.8 second run of trigeminy. 7 pt triggered events during NSR, V trigeminym, PAC's, and PVC's.     ECHO and EST in 2016- records not available    Echo    11/22/23--Interpretation Summary    Left Ventricle: The left ventricle is normal in size. Normal wall thickness. Mild global hyperkinesis present. There is low normal systolic function. Ejection fraction by visual approximation is 50%.    Right Ventricle: Mild right ventricular enlargement.    Left Atrium: Left atrium is mildly dilated.    Aortic Valve: The aortic valve is a trileaflet valve.    Mitral Valve: There is mild to moderate regurgitation.    Pulmonic Valve: There is mild regurgitation.    IVC/SVC: Normal venous pressure at 3 mmHg.    EST:  11/22/23--Negative EST.       Lab Results   Component Value Date     05/01/2024    K 4.1 05/01/2024     05/01/2024    CO2 25 05/01/2024    BUN 12 05/01/2024    CREATININE 0.80 05/01/2024    CALCIUM 9.3 05/01/2024    ANIONGAP 12 05/01/2024    ESTGFRAFRICA 105 12/15/2020    EGFRNONAA 104 04/04/2022       Lab Results   Component Value Date    CHOL 140 05/01/2024    CHOL 138 07/05/2023    CHOL 140 04/04/2022     Lab Results   Component Value Date    HDL 49 05/01/2024    HDL 47 07/05/2023    HDL 44 04/04/2022     Lab Results   Component Value Date    LDLCALC 67 05/01/2024    LDLCALC 65 07/05/2023    LDLCALC 73 04/04/2022     Lab Results   Component Value Date    TRIG 119 05/01/2024    TRIG 131 07/05/2023    TRIG 114 04/04/2022     Lab Results   Component Value Date    CHOLHDL 2.9  "05/01/2024    CHOLHDL 2.9 07/05/2023    CHOLHDL 3.2 04/04/2022       Lab Results   Component Value Date    WBC 5.68 05/01/2024    HGB 15.5 05/01/2024    HCT 46.5 05/01/2024    MCV 96.9 (H) 05/01/2024     (L) 05/01/2024           Current Outpatient Medications:     atorvastatin (LIPITOR) 40 MG tablet, Take 1 tablet (40 mg total) by mouth once daily., Disp: 90 tablet, Rfl: 1    cyanocobalamin 1,000 mcg/mL injection, Inject 1 mL (1,000 mcg total) into the muscle every 30 days., Disp: 3 mL, Rfl: 0    montelukast (SINGULAIR) 10 mg tablet, Take 1 tablet (10 mg total) by mouth once daily., Disp: 90 tablet, Rfl: 1    tamsulosin (FLOMAX) 0.4 mg Cap, Take 1 capsule (0.4 mg total) by mouth once daily., Disp: 90 capsule, Rfl: 1    benzonatate (TESSALON) 100 MG capsule, Take 100 mg by mouth 3 (three) times daily as needed. (Patient not taking: Reported on 5/1/2024), Disp: , Rfl:   Did not bring medications.     Review of Systems   Respiratory:  Negative for shortness of breath.    Cardiovascular:  Negative for chest pain, palpitations and leg swelling.   Neurological:  Negative for loss of consciousness.           Objective:   /70 (BP Location: Left arm, Patient Position: Sitting)   Pulse 62   Ht 6' 3" (1.905 m)   Wt 109.2 kg (240 lb 12.8 oz)   SpO2 (!) 94%   BMI 30.10 kg/m²     Physical Exam  Vitals reviewed.   Constitutional:       General: He is not in acute distress.     Appearance: Normal appearance.   HENT:      Head: Normocephalic and atraumatic.   Neck:      Vascular: No carotid bruit or JVD.   Cardiovascular:      Rate and Rhythm: Normal rate and regular rhythm.      Pulses: Normal pulses.           Radial pulses are 2+ on the right side and 2+ on the left side.        Dorsalis pedis pulses are 2+ on the right side and 2+ on the left side.      Heart sounds: Normal heart sounds. No murmur heard.  Pulmonary:      Effort: Pulmonary effort is normal.      Breath sounds: Normal breath sounds. "   Musculoskeletal:      Right lower leg: No edema.      Left lower leg: No edema.   Skin:     General: Skin is warm and dry.   Neurological:      Mental Status: He is alert.           Assessment:     1. Hyperlipidemia, unspecified hyperlipidemia type        2. VEENA on CPAP        3. Gastroesophageal reflux disease, unspecified whether esophagitis present        4. B12 deficiency        5. Nonspecific abnormal electrocardiogram (ECG) (EKG)  EKG 12-lead    EKG 12-lead            Plan:   Continue current medications  F/u in 6 months.

## 2024-09-10 ENCOUNTER — OFFICE VISIT (OUTPATIENT)
Dept: DERMATOLOGY | Facility: CLINIC | Age: 74
End: 2024-09-10
Payer: MEDICARE

## 2024-09-10 DIAGNOSIS — L81.8 IDIOPATHIC GUTTATE HYPOMELANOSIS: ICD-10-CM

## 2024-09-10 DIAGNOSIS — L57.0 ACTINIC KERATOSES: Primary | ICD-10-CM

## 2024-09-10 DIAGNOSIS — L82.1 SEBORRHEIC KERATOSES: ICD-10-CM

## 2024-09-10 DIAGNOSIS — L71.9 ROSACEA: ICD-10-CM

## 2024-09-10 PROCEDURE — 17000 DESTRUCT PREMALG LESION: CPT | Mod: ,,, | Performed by: STUDENT IN AN ORGANIZED HEALTH CARE EDUCATION/TRAINING PROGRAM

## 2024-09-10 PROCEDURE — 17003 DESTRUCT PREMALG LES 2-14: CPT | Mod: ,,, | Performed by: STUDENT IN AN ORGANIZED HEALTH CARE EDUCATION/TRAINING PROGRAM

## 2024-09-10 PROCEDURE — 99204 OFFICE O/P NEW MOD 45 MIN: CPT | Mod: 25,,, | Performed by: STUDENT IN AN ORGANIZED HEALTH CARE EDUCATION/TRAINING PROGRAM

## 2024-09-10 RX ORDER — DOXYCYCLINE HYCLATE 50 MG/1
50 CAPSULE, GELATIN COATED ORAL DAILY
Qty: 30 CAPSULE | Refills: 11 | Status: SHIPPED | OUTPATIENT
Start: 2024-09-10

## 2024-09-10 NOTE — PROGRESS NOTES
[facility]  Yuri Siegel MD    [address] [address]  [phone]    Fax: [phone]    Patient Name: Phuc Thorne  Medical Record Number: [id number]  PCP: Antonia Jeffries FNP  Age: 73 y.o. : 1950  Contact: [phone] (home)     CC: skin lesion  History of Present Illness:     Phuc Thorne is a 73 y.o.  male with no history of skin cancer who presents to clinic today for bleeding skin lesion on the upper cheek. This has been present for two years.  He also has rosacea previously controlled with doxycycline.     The patient has no other concerns today.    Review of Systems:     Unremarkable other than mentioned above.     Physical Exam:     General: Relaxed, oriented, alert    Skin examination of the scalp, face, neck, chest, back, abdomen, upper extremities and lower extremities were normal except for as listed below      Assessment and Plan:     1. Actinic Keratoses  Erythematous, scaly papules on right cheek (re-eval in 6 months), left cheek,     Plan: Liquid Nitrogen.  A total of 2 lesions were treated with liquid nitrogen for 2 freeze-thaw cycles lasting 5 seconds, located on the above locations.   The patient's consent was obtained including but not limited to risks of crusting, scabbing,  blistering, scarring, darker or lighter pigmentary change, recurrence, incomplete removal and infection.    Counseling.  Sun protective clothing and broad spectrum sunscreen can prevent the formation of AK.   AKs can be treated with cryotherapy, photodynamic therapy, imiquimod, topical 5-FU.  Actinic Keratoses are precancerous proliferations that occur within sun damaged skin. If untreated,  a small subset of AKs can develop into Squamous Cell Carcinoma.      2. Seborrheic keratoses   - brown stuck on appearing papules/plaques  - patient educated on benign nature. No treatment necessary unless they become irritated or inflamed       3. Rosacea  - Erythematous  papules and pustules on cheeks and nose with background erythema     Plan:   Doxycycline 50 mg daily     Counseling.  Rosacea can be a/w cardiac disease, and can involve the eyes as well.     4. Idiopathic guttate hypomelanosis  - hypopigmented macules    - discussed this benign lost of pigmentation in the skin.    5. High Risk Medication Monitoring : The risks and benefits of the medication were reviewed in full with the patient. Should any side effects occur, the patient will stop the medication and contact me immediately.    - Doxycycline Counseling:     Please be aware of the side effects of doxycycline:   - photosensitivity and increased risk for sunburn. When exposed to sunlight, patients should wear protective clothing, sunglasses, and sunscreen.   - birth defects: avoid pregnancy while on therapy due to potential birth defects.  - headaches or vision changes if taking with accutane   - throat irritation: stay upright for at least 30 minutes after taking and drink with a large glass of water   - GI disturbance: take with food to help prevent upset stomach   - Do not take at the same time as dairy or calcium containing supplements, as they reduce absorption. You can continue to consume these, but make sure it is not within an hour of taking the doxycycline     Please call our office with any extreme side effects.       Yuri Siegel MD   Mohs Surgery/Dermatologic Oncology  Dermatology

## 2024-09-10 NOTE — PATIENT INSTRUCTIONS
"Liquid Nitrogen Wound Care     - the areas treated with liquid nitrogen may form sores, blisters, and scabs. This is normal   - if a blister forms, please keep it intact and do not rupture it. It will resolve within a few days   - please keep petrolatum ointment to the area once to twice daily. You can cover with a bandage if you wish, but it is usually not necessary   - the area may be painful for a few days. We recommend ice, or over the counter tylenol or NSAIDs (such as ibuprofen or naproxen, if you are able to take these)   - this may result in a scar or a "hypopigmented" or lighter area of skin. This may or may not resolve with time   - please call our clinic if the lesion does not resolve, as this can be treated again     Doxycycline Counseling:     Please be aware of the side effects of doxycycline:   - photosensitivity and increased risk for sunburn. When exposed to sunlight, patients should wear protective clothing, sunglasses, and sunscreen.   - birth defects: avoid pregnancy while on therapy due to potential birth defects.  - headaches or vision changes if taking with accutane   - throat irritation: stay upright for at least 30 minutes after taking and drink with a large glass of water   - GI disturbance: take with food to help prevent upset stomach   - Do not take at the same time as dairy or calcium containing supplements, as they reduce absorption. You can continue to consume these, but make sure it is not within an hour of taking the doxycycline     Please call our office with any extreme side effects.     "

## 2024-10-22 ENCOUNTER — CLINICAL SUPPORT (OUTPATIENT)
Dept: FAMILY MEDICINE | Facility: CLINIC | Age: 74
End: 2024-10-22
Payer: MEDICARE

## 2024-10-22 VITALS — TEMPERATURE: 98 F

## 2024-10-22 DIAGNOSIS — Z23 NEED FOR IMMUNIZATION AGAINST INFLUENZA: Primary | ICD-10-CM

## 2024-10-22 DIAGNOSIS — J30.2 SEASONAL ALLERGIES: ICD-10-CM

## 2024-10-22 DIAGNOSIS — N40.0 BENIGN PROSTATIC HYPERPLASIA, UNSPECIFIED WHETHER LOWER URINARY TRACT SYMPTOMS PRESENT: ICD-10-CM

## 2024-10-22 DIAGNOSIS — E78.5 HYPERLIPIDEMIA, UNSPECIFIED HYPERLIPIDEMIA TYPE: ICD-10-CM

## 2024-10-22 PROCEDURE — G0008 ADMIN INFLUENZA VIRUS VAC: HCPCS | Mod: ,,, | Performed by: FAMILY MEDICINE

## 2024-10-22 PROCEDURE — 90653 IIV ADJUVANT VACCINE IM: CPT | Mod: ,,, | Performed by: FAMILY MEDICINE

## 2024-10-22 RX ORDER — ATORVASTATIN CALCIUM 40 MG/1
40 TABLET, FILM COATED ORAL
Qty: 90 TABLET | Refills: 1 | Status: SHIPPED | OUTPATIENT
Start: 2024-10-22

## 2024-10-22 RX ORDER — MONTELUKAST SODIUM 10 MG/1
10 TABLET ORAL
Qty: 90 TABLET | Refills: 1 | Status: SHIPPED | OUTPATIENT
Start: 2024-10-22

## 2024-10-22 RX ORDER — TAMSULOSIN HYDROCHLORIDE 0.4 MG/1
1 CAPSULE ORAL
Qty: 90 CAPSULE | Refills: 1 | Status: SHIPPED | OUTPATIENT
Start: 2024-10-22

## 2024-12-17 DIAGNOSIS — Z12.11 SCREENING FOR COLON CANCER: Primary | ICD-10-CM

## 2025-01-10 ENCOUNTER — OFFICE VISIT (OUTPATIENT)
Dept: CARDIOLOGY | Facility: CLINIC | Age: 75
End: 2025-01-10
Payer: MEDICARE

## 2025-01-10 VITALS
HEIGHT: 75 IN | SYSTOLIC BLOOD PRESSURE: 116 MMHG | HEART RATE: 56 BPM | WEIGHT: 244.38 LBS | DIASTOLIC BLOOD PRESSURE: 68 MMHG | OXYGEN SATURATION: 95 % | BODY MASS INDEX: 30.39 KG/M2

## 2025-01-10 DIAGNOSIS — R00.1 BRADYCARDIA: Primary | Chronic | ICD-10-CM

## 2025-01-10 DIAGNOSIS — E78.5 HYPERLIPIDEMIA, UNSPECIFIED HYPERLIPIDEMIA TYPE: Chronic | ICD-10-CM

## 2025-01-10 DIAGNOSIS — G47.33 OSA ON CPAP: Chronic | ICD-10-CM

## 2025-01-10 DIAGNOSIS — K21.9 GASTROESOPHAGEAL REFLUX DISEASE, UNSPECIFIED WHETHER ESOPHAGITIS PRESENT: Chronic | ICD-10-CM

## 2025-01-10 PROCEDURE — 99214 OFFICE O/P EST MOD 30 MIN: CPT | Mod: S$PBB,,, | Performed by: INTERNAL MEDICINE

## 2025-01-10 PROCEDURE — 93005 ELECTROCARDIOGRAM TRACING: CPT | Mod: PBBFAC | Performed by: INTERNAL MEDICINE

## 2025-01-10 PROCEDURE — 99213 OFFICE O/P EST LOW 20 MIN: CPT | Mod: PBBFAC,25 | Performed by: INTERNAL MEDICINE

## 2025-01-10 PROCEDURE — 93010 ELECTROCARDIOGRAM REPORT: CPT | Mod: S$PBB,,, | Performed by: INTERNAL MEDICINE

## 2025-01-10 PROCEDURE — 99999 PR PBB SHADOW E&M-EST. PATIENT-LVL III: CPT | Mod: PBBFAC,,, | Performed by: INTERNAL MEDICINE

## 2025-01-14 NOTE — PROGRESS NOTES
PCP: Antonia Jeffries FNP    Referring Provider:     Subjective:   Phuc Thorne is a 74 y.o. male with hx of HLD, GERD, B12 deficiency, and VEENA who presents for 6 month follow up.     24--Phuc Thorne is a 73 y.o. male with hx of HLD, GERD, B12 deficiency, and VEENA who presents for 6 month follow up.     23--Phuc Thorne is a 73 y.o. male with hx of HLD, GERD, B12 deficiency, and VEENA who presents for test results.     23--Phuc Thorne is a 74 y.o. male with hx of HLD, GERD, B12 deficiency, and VEENA who presents for c/o bradycardia. Does yard work without problems, no SOB.     Fhx:  Family History   Problem Relation Name Age of Onset    Aortic aneurysm Paternal Grandfather       Shx:   Social History     Socioeconomic History    Marital status:    Tobacco Use    Smoking status: Former     Current packs/day: 0.00     Average packs/day: 1 pack/day for 6.0 years (6.0 ttl pk-yrs)     Types: Cigarettes     Start date:      Quit date:      Years since quittin.0     Passive exposure: Past    Smokeless tobacco: Never   Substance and Sexual Activity    Alcohol use: Not Currently    Drug use: Never     Social Drivers of Health     Financial Resource Strain: Low Risk  (2023)    Overall Financial Resource Strain (CARDIA)     Difficulty of Paying Living Expenses: Not hard at all   Food Insecurity: No Food Insecurity (2023)    Hunger Vital Sign     Worried About Running Out of Food in the Last Year: Never true     Ran Out of Food in the Last Year: Never true   Transportation Needs: No Transportation Needs (2023)    PRAPARE - Transportation     Lack of Transportation (Medical): No     Lack of Transportation (Non-Medical): No   Physical Activity: Insufficiently Active (2023)    Exercise Vital Sign     Days of Exercise per Week: 2 days     Minutes of Exercise per Session: 20 min   Stress: Stress Concern Present (2023)    [employer] - Occupational  Stress Questionnaire     Feeling of Stress : To some extent   Housing Stability: Low Risk  (11/20/2023)    Housing Stability Vital Sign     Unable to Pay for Housing in the Last Year: No     Number of Places Lived in the Last Year: 1     Unstable Housing in the Last Year: No       EKG   1/10/25--sinus bradycardia, 56 bpm  6/17/24--sinus bradycardia, rightward axis, abnormal QRS-T angle, 55 bpm  11/2/23--NSR, minimal voltage criteria for LVH, 66 bpm    Zio:  11/2/23-11/16/23--Basic rhythm is sinus, avg HR 57 bpm. Rare PAC's, couplets, and triplets. Occasional PVC's, rare couplets. 30.8 second run of trigeminy. 7 pt triggered events during NSR, V trigeminym, PAC's, and PVC's.     ECHO and EST in 2016- records not available    Echo    11/22/23--Interpretation Summary    Left Ventricle: The left ventricle is normal in size. Normal wall thickness. Mild global hyperkinesis present. There is low normal systolic function. Ejection fraction by visual approximation is 50%.    Right Ventricle: Mild right ventricular enlargement.    Left Atrium: Left atrium is mildly dilated.    Aortic Valve: The aortic valve is a trileaflet valve.    Mitral Valve: There is mild to moderate regurgitation.    Pulmonic Valve: There is mild regurgitation.    IVC/SVC: Normal venous pressure at 3 mmHg.    EST:  11/22/23--Negative EST.       Lab Results   Component Value Date     05/01/2024    K 4.1 05/01/2024     05/01/2024    CO2 25 05/01/2024    BUN 12 05/01/2024    CREATININE 0.80 05/01/2024    CALCIUM 9.3 05/01/2024    ANIONGAP 12 05/01/2024    ESTGFRAFRICA 105 12/15/2020    EGFRNONAA 104 04/04/2022       Lab Results   Component Value Date    CHOL 140 05/01/2024    CHOL 138 07/05/2023    CHOL 140 04/04/2022     Lab Results   Component Value Date    HDL 49 05/01/2024    HDL 47 07/05/2023    HDL 44 04/04/2022     Lab Results   Component Value Date    LDLCALC 67 05/01/2024    LDLCALC 65 07/05/2023    LDLCALC 73 04/04/2022     Lab  "Results   Component Value Date    TRIG 119 05/01/2024    TRIG 131 07/05/2023    TRIG 114 04/04/2022     Lab Results   Component Value Date    CHOLHDL 2.9 05/01/2024    CHOLHDL 2.9 07/05/2023    CHOLHDL 3.2 04/04/2022       Lab Results   Component Value Date    WBC 5.68 05/01/2024    HGB 15.5 05/01/2024    HCT 46.5 05/01/2024    MCV 96.9 (H) 05/01/2024     (L) 05/01/2024           Current Outpatient Medications:     atorvastatin (LIPITOR) 40 MG tablet, TAKE 1 TABLET BY MOUTH EVERY DAY, Disp: 90 tablet, Rfl: 1    doxycycline 50 MG capsule, Take 1 capsule (50 mg total) by mouth once daily., Disp: 30 capsule, Rfl: 11    montelukast (SINGULAIR) 10 mg tablet, TAKE 1 TABLET BY MOUTH EVERY DAY, Disp: 90 tablet, Rfl: 1    tamsulosin (FLOMAX) 0.4 mg Cap, TAKE 1 CAPSULE BY MOUTH EVERY DAY, Disp: 90 capsule, Rfl: 1  Did not bring medications.     Review of Systems   Respiratory:  Negative for shortness of breath.    Cardiovascular:  Negative for chest pain, palpitations and leg swelling.   Neurological:  Negative for loss of consciousness.           Objective:   /68 (BP Location: Left arm, Patient Position: Sitting)   Pulse (!) 56   Ht 6' 3" (1.905 m)   Wt 110.9 kg (244 lb 6.4 oz)   SpO2 95%   BMI 30.55 kg/m²     Physical Exam  Vitals reviewed.   Constitutional:       General: He is not in acute distress.     Appearance: Normal appearance.   HENT:      Head: Normocephalic and atraumatic.   Neck:      Vascular: No carotid bruit or JVD.   Cardiovascular:      Rate and Rhythm: Normal rate and regular rhythm.      Pulses: Normal pulses.           Radial pulses are 2+ on the right side and 2+ on the left side.        Dorsalis pedis pulses are 2+ on the right side and 2+ on the left side.      Heart sounds: Normal heart sounds. No murmur heard.  Pulmonary:      Effort: Pulmonary effort is normal.      Breath sounds: Normal breath sounds.   Musculoskeletal:      Right lower leg: No edema.      Left lower leg: No " edema.   Skin:     General: Skin is warm and dry.   Neurological:      Mental Status: He is alert.           Assessment:     1. Bradycardia  EKG 12-lead    EKG 12-lead    asymptomatic      2. Hyperlipidemia, unspecified hyperlipidemia type        3. Gastroesophageal reflux disease, unspecified whether esophagitis present        4. VEENA on CPAP              Plan:   Continue current medications  F/u in one year with lipids.

## 2025-01-21 LAB
OHS QRS DURATION: 88 MS
OHS QTC CALCULATION: 422 MS

## 2025-02-27 ENCOUNTER — OFFICE VISIT (OUTPATIENT)
Dept: FAMILY MEDICINE | Facility: CLINIC | Age: 75
End: 2025-02-27
Payer: MEDICARE

## 2025-02-27 VITALS
WEIGHT: 237.63 LBS | RESPIRATION RATE: 15 BRPM | HEIGHT: 75 IN | TEMPERATURE: 98 F | HEART RATE: 56 BPM | DIASTOLIC BLOOD PRESSURE: 70 MMHG | OXYGEN SATURATION: 95 % | BODY MASS INDEX: 29.55 KG/M2 | SYSTOLIC BLOOD PRESSURE: 122 MMHG

## 2025-02-27 DIAGNOSIS — E78.5 HYPERLIPIDEMIA, UNSPECIFIED HYPERLIPIDEMIA TYPE: ICD-10-CM

## 2025-02-27 DIAGNOSIS — E53.8 B12 DEFICIENCY: Chronic | ICD-10-CM

## 2025-02-27 DIAGNOSIS — E11.9 TYPE 2 DIABETES MELLITUS WITHOUT COMPLICATION, WITHOUT LONG-TERM CURRENT USE OF INSULIN: Primary | ICD-10-CM

## 2025-02-27 LAB
ALBUMIN SERPL BCP-MCNC: 3.9 G/DL (ref 3.4–4.8)
ALBUMIN/GLOB SERPL: 1.4 {RATIO}
ALP SERPL-CCNC: 108 U/L (ref 40–150)
ALT SERPL W P-5'-P-CCNC: 45 U/L
ANION GAP SERPL CALCULATED.3IONS-SCNC: 11 MMOL/L (ref 7–16)
AST SERPL W P-5'-P-CCNC: 57 U/L (ref 5–34)
BASOPHILS # BLD AUTO: 0.04 K/UL (ref 0–0.2)
BASOPHILS NFR BLD AUTO: 0.7 % (ref 0–1)
BILIRUB SERPL-MCNC: 1.2 MG/DL
BUN SERPL-MCNC: 12 MG/DL (ref 8–26)
BUN/CREAT SERPL: 14 (ref 6–20)
CALCIUM SERPL-MCNC: 9 MG/DL (ref 8.8–10)
CHLORIDE SERPL-SCNC: 106 MMOL/L (ref 98–107)
CHOLEST SERPL-MCNC: 127 MG/DL
CHOLEST/HDLC SERPL: 3.3 {RATIO}
CO2 SERPL-SCNC: 27 MMOL/L (ref 23–31)
CREAT SERPL-MCNC: 0.83 MG/DL (ref 0.72–1.25)
DIFFERENTIAL METHOD BLD: ABNORMAL
EGFR (NO RACE VARIABLE) (RUSH/TITUS): 92 ML/MIN/1.73M2
EOSINOPHIL # BLD AUTO: 0.11 K/UL (ref 0–0.5)
EOSINOPHIL NFR BLD AUTO: 1.9 % (ref 1–4)
ERYTHROCYTE [DISTWIDTH] IN BLOOD BY AUTOMATED COUNT: 12.7 % (ref 11.5–14.5)
EST. AVERAGE GLUCOSE BLD GHB EST-MCNC: 200 MG/DL
FOLATE SERPL-MCNC: 12.9 NG/ML (ref 7–31.4)
GLOBULIN SER-MCNC: 2.8 G/DL (ref 2–4)
GLUCOSE SERPL-MCNC: 146 MG/DL (ref 82–115)
HBA1C MFR BLD HPLC: 8.6 %
HCT VFR BLD AUTO: 48.2 % (ref 40–54)
HCYS SERPL-SCNC: <10 ΜMOL/L (ref 5.1–15.4)
HDLC SERPL-MCNC: 39 MG/DL (ref 35–60)
HGB BLD-MCNC: 15.5 G/DL (ref 13.5–18)
IMM GRANULOCYTES # BLD AUTO: 0.01 K/UL (ref 0–0.04)
IMM GRANULOCYTES NFR BLD: 0.2 % (ref 0–0.4)
LDLC SERPL CALC-MCNC: 69 MG/DL
LDLC/HDLC SERPL: 1.8 {RATIO}
LYMPHOCYTES # BLD AUTO: 1.65 K/UL (ref 1–4.8)
LYMPHOCYTES NFR BLD AUTO: 28.6 % (ref 27–41)
MCH RBC QN AUTO: 32 PG (ref 27–31)
MCHC RBC AUTO-ENTMCNC: 32.2 G/DL (ref 32–36)
MCV RBC AUTO: 99.6 FL (ref 80–96)
MONOCYTES # BLD AUTO: 0.46 K/UL (ref 0–0.8)
MONOCYTES NFR BLD AUTO: 8 % (ref 2–6)
MPC BLD CALC-MCNC: 12.5 FL (ref 9.4–12.4)
NEUTROPHILS # BLD AUTO: 3.49 K/UL (ref 1.8–7.7)
NEUTROPHILS NFR BLD AUTO: 60.6 % (ref 53–65)
NONHDLC SERPL-MCNC: 88 MG/DL
NRBC # BLD AUTO: 0 X10E3/UL
NRBC, AUTO (.00): 0 %
PLATELET # BLD AUTO: 152 K/UL (ref 150–400)
POTASSIUM SERPL-SCNC: 4.4 MMOL/L (ref 3.5–5.1)
PROT SERPL-MCNC: 6.7 G/DL (ref 5.8–7.6)
RBC # BLD AUTO: 4.84 M/UL (ref 4.6–6.2)
SODIUM SERPL-SCNC: 140 MMOL/L (ref 136–145)
TRIGL SERPL-MCNC: 97 MG/DL (ref 34–140)
VIT B12 SERPL-MCNC: 551 PG/ML (ref 213–816)
VLDLC SERPL-MCNC: 19 MG/DL
WBC # BLD AUTO: 5.76 K/UL (ref 4.5–11)

## 2025-02-27 PROCEDURE — 99213 OFFICE O/P EST LOW 20 MIN: CPT | Mod: ,,, | Performed by: NURSE PRACTITIONER

## 2025-02-27 PROCEDURE — 83090 ASSAY OF HOMOCYSTEINE: CPT | Mod: ,,, | Performed by: CLINICAL MEDICAL LABORATORY

## 2025-02-27 PROCEDURE — 83036 HEMOGLOBIN GLYCOSYLATED A1C: CPT | Mod: ,,, | Performed by: CLINICAL MEDICAL LABORATORY

## 2025-02-27 PROCEDURE — 82746 ASSAY OF FOLIC ACID SERUM: CPT | Mod: ,,, | Performed by: CLINICAL MEDICAL LABORATORY

## 2025-02-27 PROCEDURE — 82607 VITAMIN B-12: CPT | Mod: ,,, | Performed by: CLINICAL MEDICAL LABORATORY

## 2025-02-27 PROCEDURE — 80053 COMPREHEN METABOLIC PANEL: CPT | Mod: ,,, | Performed by: CLINICAL MEDICAL LABORATORY

## 2025-02-27 PROCEDURE — 85025 COMPLETE CBC W/AUTO DIFF WBC: CPT | Mod: ,,, | Performed by: CLINICAL MEDICAL LABORATORY

## 2025-02-27 PROCEDURE — 80061 LIPID PANEL: CPT | Mod: ,,, | Performed by: CLINICAL MEDICAL LABORATORY

## 2025-02-27 RX ORDER — ALBUTEROL SULFATE 90 UG/1
2 INHALANT RESPIRATORY (INHALATION) EVERY 6 HOURS PRN
COMMUNITY

## 2025-02-27 RX ORDER — ACETAMINOPHEN 500 MG
0.05 TABLET ORAL DAILY
COMMUNITY

## 2025-02-27 NOTE — PROGRESS NOTES
[facility]    Chief Complaint      Chief Complaint   Patient presents with    Diabetes     FU, no other complaints at OV today     care gaps        Colorectal cancer screening scheduled for April     Diabetic eye exam- patient reports this was done in December   Dr Cooks office    Patient denies COVID vaccine at OV today but states he will get his Shingles vaccine from his local pharmacy [facility] on [address]         History of Present Illness      Phuc Thorne is a 74 y.o. male. He  has a past medical history of Cobalamin deficiency, GERD (gastroesophageal reflux disease), High cholesterol, and Sleep apnea., who presents today for f/u of his Diabetes.  Denies any complaints today.  Reports a history of Vitamin B-12 deficiency and is taking OTC supplements- would like to have this checked today as well.     Past Medical History:  Past Medical History:   Diagnosis Date    Cobalamin deficiency     GERD (gastroesophageal reflux disease)     High cholesterol     Sleep apnea        Past Surgical History:   has a past surgical history that includes Shoulder surgery (Right); Neck surgery; Colonoscopy (2012); Hip surgery; Hand surgery; Hernia repair; Cholecystectomy (12/2020); Colonoscopy (12/22/2017); Colonoscopy (03/21/2021); and Cholecystectomy (12/2020).    Social History:  Social History[1]    I personally reviewed all past medical, surgical, and social.     Review of Systems   Constitutional:  Negative for fatigue and unexpected weight change.   HENT:  Negative for sore throat.    Eyes:  Negative for visual disturbance.   Respiratory:  Negative for shortness of breath.    Cardiovascular: Negative.    Gastrointestinal:  Negative for abdominal pain, constipation, diarrhea, nausea and vomiting.   Endocrine: Negative for polydipsia, polyphagia and polyuria.   Genitourinary:  Negative for difficulty urinating.   Musculoskeletal:  Negative for gait problem.   Skin: Negative.    Neurological:  Negative for dizziness,  "light-headedness and headaches.        Medications:  Encounter Medications[2]    Allergies:  Review of patient's allergies indicates:   Allergen Reactions    Shrimp Nausea And Vomiting and Rash     "Passes out" pt/wife states "not allergic to iodine" and has received iodine without any problems-    Codeine Other (See Comments)    Opioids - morphine analogues Other (See Comments)       Health Maintenance:  Immunization History   Administered Date(s) Administered    COVID-19 MRNA, LN-S PF (MODERNA HALF 0.25 ML DOSE) 11/11/2021    COVID-19, MRNA, LN-S, PF (MODERNA FULL 0.5 ML DOSE) 01/19/2021, 02/21/2021    Influenza (FLUAD) - Quadrivalent - Adjuvanted - PF *Preferred* (65+) 10/25/2022, 10/16/2023    Influenza - Quadrivalent - High Dose - PF (65 years and older) 11/02/2021    Influenza - Quadrivalent - PF *Preferred* (6 months and older) 11/01/2017    Influenza - Trivalent - Fluad - Adjuvanted - PF (65 years and older 10/22/2024    Influenza - Trivalent - Fluzone High Dose - PF (65 years and older) 11/05/2018, 11/11/2019, 10/06/2020    Pneumococcal Conjugate - 13 Valent 11/05/2018    Pneumococcal Conjugate - 20 Valent 10/16/2023    Pneumococcal Polysaccharide - 23 Valent 11/01/2017    Tdap 07/27/2017      Health Maintenance   Topic Date Due    Shingles Vaccine (1 of 2) Never done    COVID-19 Vaccine (4 - 2024-25 season) 09/01/2024    Diabetic Eye Exam  12/28/2024    Diabetes Urine Screening  05/01/2025    Foot Exam  05/01/2025    Hemoglobin A1c  05/27/2025    RSV Vaccine (Age 60+ and Pregnant patients) (1 - 1-dose 75+ series) 11/17/2025    Low Dose Statin  02/27/2026    Lipid Panel  02/27/2026    Colorectal Cancer Screening  03/19/2026    TETANUS VACCINE  07/27/2027    Hepatitis C Screening  Completed    Influenza Vaccine  Completed    Pneumococcal Vaccines (Age 50+)  Completed    Abdominal Aortic Aneurysm Screening  Completed        Physical Exam      Vital Signs  Temp: 98 °F (36.7 °C)  Temp Source: Oral  Pulse: (!) " "56  Resp: 15  SpO2: 95 %  BP: 122/70  BP Location: Left arm  Patient Position: Sitting  Pain Score: 0-No pain  Height and Weight  Height: 6' 3" (190.5 cm)  Weight: 107.8 kg (237 lb 9.6 oz)  BSA (Calculated - sq m): 2.39 sq meters  BMI (Calculated): 29.7  Weight in (lb) to have BMI = 25: 199.6]    Physical Exam  Vitals and nursing note reviewed.   Constitutional:       Appearance: Normal appearance.   HENT:      Head: Normocephalic.      Right Ear: Hearing normal.      Left Ear: Hearing normal.      Nose: Nose normal.   Eyes:      General: Lids are normal.      Extraocular Movements: Extraocular movements intact.      Conjunctiva/sclera: Conjunctivae normal.      Pupils: Pupils are equal, round, and reactive to light.   Neck:      Thyroid: No thyromegaly.   Cardiovascular:      Rate and Rhythm: Regular rhythm. Bradycardia present.      Heart sounds: Normal heart sounds.   Pulmonary:      Effort: Pulmonary effort is normal.      Breath sounds: Normal breath sounds.   Musculoskeletal:         General: Normal range of motion.      Cervical back: Normal range of motion and neck supple.      Right lower leg: No edema.      Left lower leg: No edema.   Skin:     General: Skin is warm and dry.      Findings: No rash.   Neurological:      General: No focal deficit present.      Mental Status: He is alert and oriented to person, place, and time.      Gait: Gait is intact.          Laboratory:  CBC:  Recent Labs   Lab 07/05/23  0843 05/01/24  0920 02/27/25  0917   WBC 5.83 5.68 5.76   RBC 4.91 4.80 4.84   Hemoglobin 15.7 15.5 15.5   Hematocrit 46.5 46.5 48.2   Platelet Count 145 L 149 L 152   MCV 94.7 96.9 H 99.6 H   MCH 32.0 H 32.3 H 32.0 H   MCHC 33.8 33.3 32.2     CMP:  Recent Labs   Lab 07/05/23  0843 05/01/24  0920 02/27/25  0917   Glucose 145 H 118 H 146 H   Calcium 9.2 9.3 9.0   Albumin 3.8 3.9 3.9   Total Protein 7.3 7.3 6.7   Sodium 142 140 140   Potassium 4.4 4.1 4.4   CO2 26 25 27   Chloride 108 H 107 106   BUN 11 12 " 12   Alk Phos 106 111 108   ALT 46 38 45   AST 41 H 33 57 H   Bilirubin, Total 0.6 1.3 H 1.2     LIPIDS:  Recent Labs   Lab 23  0843 24  0920 25  0917   TSH 1.520  --   --    HDL Cholesterol 47 49 39   Cholesterol 138 140 127   Triglycerides 131 119 97   LDL Calculated 65 67 69   Cholesterol/HDL Ratio (Risk Factor) 2.9 2.9 3.3   Non-HDL 91 91 88     TSH:  Recent Labs   Lab 23  0843   TSH 1.520     A1C:  Recent Labs   Lab 23  0843 10/16/23  0902 24  0920 25  0917   Hemoglobin A1C 7.0 H 6.4 6.9 H 8.6 H       Assessment/Plan     Phuc Thorne is a 74 y.o.male with:     1. Type 2 diabetes mellitus without complication, without long-term current use of insulin  -     Hemoglobin A1C; Future; Expected date: 2025    2. Hyperlipidemia, unspecified hyperlipidemia type  -     Comprehensive Metabolic Panel; Future; Expected date: 2025  -     CBC Auto Differential; Future; Expected date: 2025  -     Lipid Panel; Future; Expected date: 2025    3. B12 deficiency  -     Homocysteine, Serum; Future; Expected date: 2025  -     Vitamin B12 & Folate; Future; Expected date: 2025         Total time spent face-to-face and non-face-to-face coordinating care for this encounter was: 20 minutes     Chronic conditions status updated as per HPI.  Other than changes above, cont current medications and maintain follow up with specialists.  Return to clinic in 3 month(s).    Antonia Jeffries, FNP  [facility]         [1]   Social History  Tobacco Use    Smoking status: Former     Current packs/day: 0.00     Average packs/day: 1 pack/day for 6.0 years (6.0 ttl pk-yrs)     Types: Cigarettes     Start date:      Quit date:      Years since quittin.1     Passive exposure: Past    Smokeless tobacco: Never   Substance Use Topics    Alcohol use: Not Currently    Drug use: Never   [2]   Outpatient Encounter Medications as of 2025   Medication Sig Dispense  Refill    atorvastatin (LIPITOR) 40 MG tablet TAKE 1 TABLET BY MOUTH EVERY DAY 90 tablet 1    doxycycline 50 MG capsule Take 1 capsule (50 mg total) by mouth once daily. 30 capsule 11    montelukast (SINGULAIR) 10 mg tablet TAKE 1 TABLET BY MOUTH EVERY DAY 90 tablet 1    tamsulosin (FLOMAX) 0.4 mg Cap TAKE 1 CAPSULE BY MOUTH EVERY DAY 90 capsule 1    albuterol (PROVENTIL/VENTOLIN HFA) 90 mcg/actuation inhaler Inhale 2 puffs into the lungs every 6 (six) hours as needed.      cholecalciferol, vitamin D3, (VITAMIN D3) 50 mcg (2,000 unit) Cap capsule Take 0.05 mg by mouth once daily.       No facility-administered encounter medications on file as of 2/27/2025.

## 2025-03-05 ENCOUNTER — TELEPHONE (OUTPATIENT)
Dept: DERMATOLOGY | Facility: CLINIC | Age: 75
End: 2025-03-05
Payer: MEDICARE

## 2025-03-14 ENCOUNTER — OFFICE VISIT (OUTPATIENT)
Dept: DERMATOLOGY | Facility: CLINIC | Age: 75
End: 2025-03-14
Payer: MEDICARE

## 2025-03-14 DIAGNOSIS — L21.9 SEBORRHEIC DERMATITIS: Primary | ICD-10-CM

## 2025-03-14 DIAGNOSIS — L71.9 ROSACEA: ICD-10-CM

## 2025-03-14 DIAGNOSIS — D48.5 NEOPLASM OF UNCERTAIN BEHAVIOR OF SKIN: ICD-10-CM

## 2025-03-14 RX ORDER — KETOCONAZOLE 20 MG/G
CREAM TOPICAL DAILY
Qty: 60 G | Refills: 11 | Status: SHIPPED | OUTPATIENT
Start: 2025-03-14

## 2025-03-14 RX ORDER — DOXYCYCLINE HYCLATE 50 MG/1
50 CAPSULE, GELATIN COATED ORAL DAILY
Qty: 30 CAPSULE | Refills: 11 | Status: SHIPPED | OUTPATIENT
Start: 2025-03-14

## 2025-03-14 NOTE — PROGRESS NOTES
[facility]  Yuri Siegel MD    [facility] [place] [address]  [phone]    Fax: [phone]    Patient Name: Phuc Thorne  Medical Record Number: [id number]  PCP: Antonia Jeffries FNP  Age: 74 y.o. : 1950  Contact: [phone] (home)     History of Present Illness:     Phuc hTorne is a 74 y.o.  male here for follow up of actinic keratoses that was treate with Ln2 and --- . He is also f/u on rosacea treated with D    The patient has no other concerns today.    Review of Systems:     Unremarkable other than mentioned above.     Physical Exam:     General: Relaxed, oriented, alert    Skin examination of the scalp, face, neck, chest, back, abdomen, upper extremities and lower extremities were normal except for as listed below      Assessment and Plan:     1. ***    2. ***      Yuri Siegel MD   Mohs Surgery/Dermatologic Oncology  Dermatology

## 2025-03-14 NOTE — PROGRESS NOTES
[facility]  Yuri Siegel MD    [address] [place] [id number]  [phone]    Fax: [phone]    Patient Name: Phuc Thorne  Medical Record Number: [id number]  PCP: Antonia Jeffries FNP  Age: 74 y.o. : 1950  Contact: [phone] (home)     History of Present Illness:     Phuc Thorne is a 74 y.o.  male here for follow up of actinic keratosis of R infraorbital cheek. It reportedly became infected and has caused scarring. He is also f/u on rosacea treated with Doxycycline 50 mg daily that has improved.     The patient has no other concerns today.    Review of Systems:     Unremarkable other than mentioned above.     Physical Exam:     General: Relaxed, oriented, alert    Skin examination of the scalp, face, neck, chest, back, abdomen, upper extremities and lower extremities were normal except for as listed below      Assessment and Plan:     1. Seborrheic Dermatitis   - Red scaly plaques located on the scalp, nasolabial folds, and eyebrows    Plan:   - ketoconazole cream bid PRN to face or ears        2. Rosacea  - Erythematous papules and pustules on cheeks and nose with background erythema     Plan:    Doxycycline 50mg daily     Counseling.  Rosacea can be a/w cardiac disease, and can involve the eyes as well.     3. Monitor suspected AK in scar on right cheek (re-eval in 6 months)     Yuri Siegel MD   Mohs Surgery/Dermatologic Oncology  Dermatology

## 2025-04-03 ENCOUNTER — RESULTS FOLLOW-UP (OUTPATIENT)
Dept: FAMILY MEDICINE | Facility: CLINIC | Age: 75
End: 2025-04-03

## 2025-04-04 ENCOUNTER — TELEPHONE (OUTPATIENT)
Dept: FAMILY MEDICINE | Facility: CLINIC | Age: 75
End: 2025-04-04
Payer: MEDICARE

## 2025-04-04 NOTE — TELEPHONE ENCOUNTER
----- Message from JOSE Contreras sent at 4/3/2025  5:46 PM CDT -----  Patient A1c is up- 8.6%- other lab work ok; He is not on medication for diabetes- not sure if he is willing to take but if so would recommend OV to discuss  ----- Message -----  From: Lab, Background User  Sent: 2/27/2025   8:35 PM CDT  To: JOSE Falcon

## 2025-04-04 NOTE — TELEPHONE ENCOUNTER
"HPI:   66 y.o.   OB History      Para Term  AB Living    3 3 2     1    SAB TAB Ectopic Multiple Live Births            1       No LMP recorded. Patient is postmenopausal.    Patient is  here for her annual gynecologic exam.  She has no complaints.     ROS:  GENERAL: No fever, chills, fatigability or weight loss.  SKIN: No rashes, itching or changes in color or texture of skin.  HEAD: No headaches or recent head trauma.  EYES: Visual acuity fine. No photophobia, ocular pain or diplopia.  EARS: Denies ear pain, discharge or vertigo.  NOSE: No loss of smell, no epistaxis or postnasal drip.  MOUTH & THROAT: No hoarseness or change in voice. No excessive gum bleeding.  NODES: Denies swollen glands.  CHEST: Denies MANCERA, cyanosis, wheezing, cough and sputum production.  CARDIOVASCULAR: Denies chest pain, PND, orthopnea or reduced exercise tolerance.  ABDOMEN: Appetite fine. No weight loss. Denies diarrhea, abdominal pain, hematemesis or blood in stool.  URINARY: No flank pain, dysuria or hematuria.  PERIPHERAL VASCULAR: No claudication or cyanosis.  MUSCULOSKELETAL: No joint stiffness or swelling. Denies back pain.  NEUROLOGIC: No history of seizures, paralysis, alteration of gait or coordination.    PE:   /60   Ht 5' 3.25" (1.607 m)   Wt 57.7 kg (127 lb 3.3 oz)   BMI 22.36 kg/m²   APPEARANCE: Well nourished, well developed, in no acute distress.  NECK: Neck symmetric without masses or thyromegaly.  BREASTS: Symmetrical, no skin changes or visible lesions. No palpable masses, nipple discharge or adenopathy bilaterally.  ABDOMEN: Flat. Soft. No tenderness or masses. No hepatosplenomegaly. No hernias. No CVA tenderness.  VULVA: No lesions. Normal female genitalia.  URETHRAL MEATUS: Normal size and location, no lesions, no prolapse.  URETHRA: No masses, tenderness, prolapse or scarring.  VAGINA: Moist and well rugated, no discharge, no significant cystocele or rectocele.  CERVIX: No lesions and " Patient called clinic back. Notified patient of results. Patient voiced understanding. Scheduled patient for a follow-up with Antonia on 04/08/25 at 0920 for lab review.   discharge. PAP done.  UTERUS: Normal size, regular shape, mobile, non-tender, bladder base nontender.  ADNEXA: No masses, tenderness or CDS nodularity.  ANUS PERINEUM: Normal.    PROCEDURES:  Pap smear    Assessment:  Normal Gynecologic Exam    Plan:  Mammogram and Colonoscopy if indicated by current recommendations.  Return to clinic in one year or for any problems or complaints.  No gyn co

## 2025-04-08 ENCOUNTER — OFFICE VISIT (OUTPATIENT)
Dept: FAMILY MEDICINE | Facility: CLINIC | Age: 75
End: 2025-04-08
Payer: MEDICARE

## 2025-04-08 VITALS
HEIGHT: 75 IN | SYSTOLIC BLOOD PRESSURE: 150 MMHG | OXYGEN SATURATION: 95 % | WEIGHT: 234 LBS | HEART RATE: 50 BPM | DIASTOLIC BLOOD PRESSURE: 83 MMHG | BODY MASS INDEX: 29.09 KG/M2 | RESPIRATION RATE: 14 BRPM | TEMPERATURE: 98 F

## 2025-04-08 DIAGNOSIS — I10 ESSENTIAL HYPERTENSION, BENIGN: ICD-10-CM

## 2025-04-08 DIAGNOSIS — E11.9 TYPE 2 DIABETES MELLITUS WITHOUT COMPLICATION, WITHOUT LONG-TERM CURRENT USE OF INSULIN: Primary | ICD-10-CM

## 2025-04-08 LAB
CREAT UR-MCNC: 150 MG/DL (ref 23–375)
MICROALBUMIN UR-MCNC: 1.4 MG/DL
MICROALBUMIN/CREAT RATIO PNL UR: 9.3 MG/G (ref 0–30)

## 2025-04-08 PROCEDURE — 99213 OFFICE O/P EST LOW 20 MIN: CPT | Mod: ,,, | Performed by: NURSE PRACTITIONER

## 2025-04-08 PROCEDURE — 82570 ASSAY OF URINE CREATININE: CPT | Mod: ,,, | Performed by: CLINICAL MEDICAL LABORATORY

## 2025-04-08 PROCEDURE — 82043 UR ALBUMIN QUANTITATIVE: CPT | Mod: ,,, | Performed by: CLINICAL MEDICAL LABORATORY

## 2025-04-08 RX ORDER — METFORMIN HYDROCHLORIDE 500 MG/1
500 TABLET ORAL 2 TIMES DAILY WITH MEALS
Qty: 60 TABLET | Refills: 1 | Status: SHIPPED | OUTPATIENT
Start: 2025-04-08

## 2025-04-08 RX ORDER — LISINOPRIL 5 MG/1
5 TABLET ORAL DAILY
Qty: 30 TABLET | Refills: 1 | Status: SHIPPED | OUTPATIENT
Start: 2025-04-08

## 2025-04-08 NOTE — PROGRESS NOTES
[facility] Medicine    Chief Complaint      Chief Complaint   Patient presents with    Diabetes     A1c; has never been on anything for his diabetes; 157 this morning    Bradycardia     Sees a cardiologist regarding his heart rate    Hypertension     States normal it is fine but this last year it has been staying on the higher end.       History of Present Illness      Phuc Thorne is a 74 y.o. male. He  has a past medical history of Cobalamin deficiency, GERD (gastroesophageal reflux disease), High cholesterol, and Sleep apnea., who presents today for f/u of his recent lab work- A1c is up to 8.6%.  BP is also elevated today. Reports he does have a glucometer at home and he monitors his blood sugar daily.     Past Medical History:  Past Medical History:   Diagnosis Date    Cobalamin deficiency     GERD (gastroesophageal reflux disease)     High cholesterol     Sleep apnea        Past Surgical History:   has a past surgical history that includes Shoulder surgery (Right); Neck surgery; Colonoscopy (2012); Hip surgery; Hand surgery; Hernia repair; Cholecystectomy (12/2020); Colonoscopy (12/22/2017); Colonoscopy (03/21/2021); and Cholecystectomy (12/2020).    Social History:  Social History[1]    I personally reviewed all past medical, surgical, and social.     Review of Systems   Constitutional:  Negative for fatigue and unexpected weight change.   HENT:  Negative for sore throat.    Eyes:  Negative for visual disturbance.   Respiratory:  Negative for shortness of breath.    Cardiovascular: Negative.    Gastrointestinal:  Negative for abdominal pain, constipation, diarrhea, nausea and vomiting.   Genitourinary:  Negative for difficulty urinating.   Musculoskeletal:  Negative for gait problem.   Skin: Negative.    Neurological:  Negative for dizziness, light-headedness and headaches.        Medications:  Encounter Medications[2]    Allergies:  Review of patient's allergies indicates:   Allergen Reactions    Shrimp  "Nausea And Vomiting and Rash     "Passes out" pt/wife states "not allergic to iodine" and has received iodine without any problems-    Codeine Other (See Comments)    Opioids - morphine analogues Other (See Comments)       Health Maintenance:  Immunization History   Administered Date(s) Administered    COVID-19 MRNA, LN-S PF (MODERNA HALF 0.25 ML DOSE) 11/11/2021    COVID-19, MRNA, LN-S, PF (MODERNA FULL 0.5 ML DOSE) 01/19/2021, 02/21/2021    Influenza (FLUAD) - Quadrivalent - Adjuvanted - PF *Preferred* (65+) 10/25/2022, 10/16/2023    Influenza - Quadrivalent - High Dose - PF (65 years and older) 11/02/2021    Influenza - Quadrivalent - PF *Preferred* (6 months and older) 11/01/2017    Influenza - Trivalent - Fluad - Adjuvanted - PF (65 years and older 10/22/2024    Influenza - Trivalent - Fluzone High Dose - PF (65 years and older) 11/05/2018, 11/11/2019, 10/06/2020    Pneumococcal Conjugate - 13 Valent 11/05/2018    Pneumococcal Conjugate - 20 Valent 10/16/2023    Pneumococcal Polysaccharide - 23 Valent 11/01/2017    Tdap 07/27/2017      Health Maintenance   Topic Date Due    Shingles Vaccine (1 of 2) Never done    COVID-19 Vaccine (4 - 2024-25 season) 09/01/2024    Diabetes Urine Screening  05/01/2025    Foot Exam  05/01/2025    Hemoglobin A1c  05/27/2025    RSV Vaccine (Age 60+ and Pregnant patients) (1 - 1-dose 75+ series) 11/17/2025    Diabetic Eye Exam  12/20/2025    Lipid Panel  02/27/2026    Colorectal Cancer Screening  03/19/2026    Low Dose Statin  04/08/2026    TETANUS VACCINE  07/27/2027    Hepatitis C Screening  Completed    Influenza Vaccine  Completed    Pneumococcal Vaccines (Age 50+)  Completed    Abdominal Aortic Aneurysm Screening  Completed        Physical Exam      Vital Signs  Temp: 98.1 °F (36.7 °C)  Temp Source: Oral  Pulse: (!) 50  Resp: 14  SpO2: 95 %  BP: (!) 150/83  Pain Score: 0-No pain  Height and Weight  Height: 6' 3" (190.5 cm)  Weight: 106.1 kg (234 lb)  BSA (Calculated - sq m): " 2.37 sq meters  BMI (Calculated): 29.2  Weight in (lb) to have BMI = 25: 199.6]    Physical Exam  Vitals and nursing note reviewed.   Constitutional:       Appearance: Normal appearance.   HENT:      Head: Normocephalic.      Right Ear: Hearing normal.      Left Ear: Hearing normal.      Nose: Nose normal.   Eyes:      General: Lids are normal.      Conjunctiva/sclera: Conjunctivae normal.   Neck:      Thyroid: No thyromegaly.   Cardiovascular:      Rate and Rhythm: Regular rhythm. Bradycardia present.      Heart sounds: Normal heart sounds.   Pulmonary:      Effort: Pulmonary effort is normal.      Breath sounds: Normal breath sounds.   Musculoskeletal:         General: Normal range of motion.      Cervical back: Normal range of motion and neck supple.      Right lower leg: No edema.      Left lower leg: No edema.   Skin:     General: Skin is warm and dry.   Neurological:      General: No focal deficit present.      Mental Status: He is alert and oriented to person, place, and time.      Gait: Gait is intact.          Laboratory:  CBC:  Recent Labs   Lab 07/05/23  0843 05/01/24  0920 02/27/25  0917   WBC 5.83 5.68 5.76   RBC 4.91 4.80 4.84   Hemoglobin 15.7 15.5 15.5   Hematocrit 46.5 46.5 48.2   Platelet Count 145 L 149 L 152   MCV 94.7 96.9 H 99.6 H   MCH 32.0 H 32.3 H 32.0 H   MCHC 33.8 33.3 32.2     CMP:  Recent Labs   Lab 07/05/23  0843 05/01/24  0920 02/27/25  0917   Glucose 145 H 118 H 146 H   Calcium 9.2 9.3 9.0   Albumin 3.8 3.9 3.9   Total Protein 7.3 7.3 6.7   Sodium 142 140 140   Potassium 4.4 4.1 4.4   CO2 26 25 27   Chloride 108 H 107 106   BUN 11 12 12   Alk Phos 106 111 108   ALT 46 38 45   AST 41 H 33 57 H   Bilirubin, Total 0.6 1.3 H 1.2     LIPIDS:  Recent Labs   Lab 07/05/23  0843 05/01/24  0920 02/27/25  0917   TSH 1.520  --   --    HDL Cholesterol 47 49 39   Cholesterol 138 140 127   Triglycerides 131 119 97   LDL Calculated 65 67 69   Cholesterol/HDL Ratio (Risk Factor) 2.9 2.9 3.3   Non-HDL  91 91 88     TSH:  Recent Labs   Lab 23  0843   TSH 1.520     A1C:  Recent Labs   Lab 23  0843 10/16/23  0902 24  0920 25  0917   Hemoglobin A1C 7.0 H 6.4 6.9 H 8.6 H       Assessment/Plan     Phuc Thorne is a 74 y.o.male with:     1. Type 2 diabetes mellitus without complication, without long-term current use of insulin  -     Microalbumin/Creatinine Ratio, Urine  -     metFORMIN (GLUCOPHAGE) 500 MG tablet; Take 1 tablet (500 mg total) by mouth 2 (two) times daily with meals.  Dispense: 60 tablet; Refill: 1    2. Essential hypertension, benign  -     lisinopriL (PRINIVIL,ZESTRIL) 5 MG tablet; Take 1 tablet (5 mg total) by mouth once daily.  Dispense: 30 tablet; Refill: 1         Reviewed patient's lab work today  Discussed will start on Metformin and ACE inhibitor for his DM and HTN per ADA recommendations; patient is currently taking a statin        Total time spent face-to-face and non-face-to-face coordinating care for this encounter was: 20 minutes     Chronic conditions status updated as per HPI.  Other than changes above, cont current medications and maintain follow up with specialists.  Return to clinic in 1 month(s).    UCHE FalconP  [facility]         [1]   Social History  Tobacco Use    Smoking status: Former     Current packs/day: 0.00     Average packs/day: 1 pack/day for 6.0 years (6.0 ttl pk-yrs)     Types: Cigarettes     Start date:      Quit date:      Years since quittin.3     Passive exposure: Past    Smokeless tobacco: Never   Substance Use Topics    Alcohol use: Not Currently    Drug use: Never   [2]   Outpatient Encounter Medications as of 2025   Medication Sig Dispense Refill    albuterol (PROVENTIL/VENTOLIN HFA) 90 mcg/actuation inhaler Inhale 2 puffs into the lungs every 6 (six) hours as needed.      atorvastatin (LIPITOR) 40 MG tablet TAKE 1 TABLET BY MOUTH EVERY DAY 90 tablet 1    cholecalciferol, vitamin D3, (VITAMIN D3) 50 mcg  (2,000 unit) Cap capsule Take 0.05 mg by mouth once daily.      doxycycline 50 MG capsule Take 1 capsule (50 mg total) by mouth once daily. 30 capsule 11    ketoconazole (NIZORAL) 2 % cream Apply topically once daily. 60 g 11    montelukast (SINGULAIR) 10 mg tablet TAKE 1 TABLET BY MOUTH EVERY DAY 90 tablet 1    tamsulosin (FLOMAX) 0.4 mg Cap TAKE 1 CAPSULE BY MOUTH EVERY DAY 90 capsule 1    lisinopriL (PRINIVIL,ZESTRIL) 5 MG tablet Take 1 tablet (5 mg total) by mouth once daily. 30 tablet 1    metFORMIN (GLUCOPHAGE) 500 MG tablet Take 1 tablet (500 mg total) by mouth 2 (two) times daily with meals. 60 tablet 1     No facility-administered encounter medications on file as of 4/8/2025.

## 2025-04-09 ENCOUNTER — RESULTS FOLLOW-UP (OUTPATIENT)
Dept: FAMILY MEDICINE | Facility: CLINIC | Age: 75
End: 2025-04-09

## 2025-04-20 DIAGNOSIS — J30.2 SEASONAL ALLERGIES: ICD-10-CM

## 2025-04-20 DIAGNOSIS — N40.0 BENIGN PROSTATIC HYPERPLASIA, UNSPECIFIED WHETHER LOWER URINARY TRACT SYMPTOMS PRESENT: ICD-10-CM

## 2025-04-21 RX ORDER — TAMSULOSIN HYDROCHLORIDE 0.4 MG/1
1 CAPSULE ORAL
Qty: 90 CAPSULE | Refills: 1 | Status: SHIPPED | OUTPATIENT
Start: 2025-04-21

## 2025-04-21 RX ORDER — MONTELUKAST SODIUM 10 MG/1
10 TABLET ORAL
Qty: 90 TABLET | Refills: 1 | Status: SHIPPED | OUTPATIENT
Start: 2025-04-21

## 2025-04-22 ENCOUNTER — HOSPITAL ENCOUNTER (OUTPATIENT)
Dept: GASTROENTEROLOGY | Facility: HOSPITAL | Age: 75
Discharge: HOME | End: 2025-04-22
Attending: INTERNAL MEDICINE | Admitting: INTERNAL MEDICINE
Payer: MEDICARE

## 2025-04-22 ENCOUNTER — ANESTHESIA (OUTPATIENT)
Dept: GASTROENTEROLOGY | Facility: HOSPITAL | Age: 75
End: 2025-04-22
Payer: MEDICARE

## 2025-04-22 ENCOUNTER — ANESTHESIA EVENT (OUTPATIENT)
Dept: GASTROENTEROLOGY | Facility: HOSPITAL | Age: 75
End: 2025-04-22
Payer: MEDICARE

## 2025-04-22 VITALS
HEIGHT: 75 IN | DIASTOLIC BLOOD PRESSURE: 47 MMHG | BODY MASS INDEX: 28.23 KG/M2 | SYSTOLIC BLOOD PRESSURE: 117 MMHG | TEMPERATURE: 97 F | OXYGEN SATURATION: 95 % | HEART RATE: 49 BPM | RESPIRATION RATE: 13 BRPM | WEIGHT: 227 LBS

## 2025-04-22 DIAGNOSIS — K57.30 DIVERTICULOSIS OF COLON: Primary | ICD-10-CM

## 2025-04-22 DIAGNOSIS — Z12.11 SCREENING FOR COLON CANCER: ICD-10-CM

## 2025-04-22 DIAGNOSIS — K63.5 POLYP OF TRANSVERSE COLON, UNSPECIFIED TYPE: ICD-10-CM

## 2025-04-22 DIAGNOSIS — Z86.0100 HISTORY OF COLON POLYPS: ICD-10-CM

## 2025-04-22 LAB — GLUCOSE SERPL-MCNC: 124 MG/DL (ref 70–105)

## 2025-04-22 PROCEDURE — 88305 TISSUE EXAM BY PATHOLOGIST: CPT | Mod: 26,,, | Performed by: PATHOLOGY

## 2025-04-22 PROCEDURE — 45380 COLONOSCOPY AND BIOPSY: CPT | Mod: PT,,, | Performed by: INTERNAL MEDICINE

## 2025-04-22 PROCEDURE — 88305 TISSUE EXAM BY PATHOLOGIST: CPT | Mod: TC,SUR | Performed by: INTERNAL MEDICINE

## 2025-04-22 PROCEDURE — 63600175 PHARM REV CODE 636 W HCPCS: Performed by: ANESTHESIOLOGY

## 2025-04-22 PROCEDURE — D9220A PRA ANESTHESIA: Mod: PT,,, | Performed by: ANESTHESIOLOGY

## 2025-04-22 PROCEDURE — 45380 COLONOSCOPY AND BIOPSY: CPT | Mod: PT | Performed by: INTERNAL MEDICINE

## 2025-04-22 PROCEDURE — 27201423 OPTIME MED/SURG SUP & DEVICES STERILE SUPPLY

## 2025-04-22 PROCEDURE — 37000008 HC ANESTHESIA 1ST 15 MINUTES

## 2025-04-22 PROCEDURE — 37000009 HC ANESTHESIA EA ADD 15 MINS

## 2025-04-22 PROCEDURE — 82962 GLUCOSE BLOOD TEST: CPT

## 2025-04-22 RX ORDER — SODIUM CHLORIDE, SODIUM LACTATE, POTASSIUM CHLORIDE, CALCIUM CHLORIDE 600; 310; 30; 20 MG/100ML; MG/100ML; MG/100ML; MG/100ML
INJECTION, SOLUTION INTRAVENOUS CONTINUOUS
Status: DISCONTINUED | OUTPATIENT
Start: 2025-04-22 | End: 2025-04-23 | Stop reason: HOSPADM

## 2025-04-22 RX ORDER — LIDOCAINE HYDROCHLORIDE 20 MG/ML
INJECTION, SOLUTION EPIDURAL; INFILTRATION; INTRACAUDAL; PERINEURAL
Status: DISCONTINUED | OUTPATIENT
Start: 2025-04-22 | End: 2025-04-22

## 2025-04-22 RX ORDER — PROPOFOL 10 MG/ML
VIAL (ML) INTRAVENOUS
Status: DISCONTINUED | OUTPATIENT
Start: 2025-04-22 | End: 2025-04-22

## 2025-04-22 RX ORDER — SODIUM CHLORIDE 0.9 % (FLUSH) 0.9 %
10 SYRINGE (ML) INJECTION EVERY 6 HOURS PRN
Status: DISCONTINUED | OUTPATIENT
Start: 2025-04-22 | End: 2025-04-23 | Stop reason: HOSPADM

## 2025-04-22 RX ADMIN — PROPOFOL 100 MG: 10 INJECTION, EMULSION INTRAVENOUS at 11:04

## 2025-04-22 RX ADMIN — LIDOCAINE HYDROCHLORIDE 50 MG: 20 INJECTION, SOLUTION EPIDURAL; INFILTRATION; INTRACAUDAL; PERINEURAL at 11:04

## 2025-04-22 RX ADMIN — PROPOFOL 50 MG: 10 INJECTION, EMULSION INTRAVENOUS at 11:04

## 2025-04-22 NOTE — TRANSFER OF CARE
"Anesthesia Transfer of Care Note    Patient: Phuc Thorne    Procedure(s) Performed: * colonoscopy *    Patient location: GI    Anesthesia Type: MAC    Transport from OR: Transported from OR on room air with adequate spontaneous ventilation. Continuous ECG monitoring in transport. Continuous SpO2 monitoring in transport    Post pain: adequate analgesia    Post assessment: no apparent anesthetic complications    Post vital signs: stable    Level of consciousness: awake and responds to stimulation    Nausea/Vomiting: no nausea/vomiting    Complications: none    Transfer of care protocol was followed      Last vitals: Visit Vitals  /76 (BP Location: Right arm, Patient Position: Lying)   Pulse (!) 53   Temp 36.1 °C (97 °F)   Resp 12   Ht 6' 3" (1.905 m)   Wt 103 kg (227 lb)   SpO2 (!) 94%   BMI 28.37 kg/m²     "

## 2025-04-22 NOTE — DISCHARGE INSTRUCTIONS
Procedure Date  4/22/25     Impression  Overall Impression:   Polyp in the transverse colon; performed cold forceps biopsy  Digital rectal exam revealed no mass.  The colon was examined from the rectum to the cecum and shallow diverticula were present in the descending and sigmoid colon.  One diminutive polyp was removed with biopsy from the transverse colon.     Recommendation  Await pathology results, due: 4/24/2025   Repeat colonoscopy in 5 years      Disposition: Discharge patient to home in stable condition.  High-fiber diet.  No driving until tomorrow.  Follow up pathology results 2 working days.  Follow up with PCP as scheduled, return to GI clinic p.r.n..    THE NURSE WILL CALL YOU WITH YOUR BIOPSY RESULTS IN A FEW DAYS. IF YOU HAVE  [facility] MYCHART YOUR RESULTS WILL APPEAR THERE AS WELL.NO DRIVING, OPERATING EQUIPMENT, OR SIGNING LEGAL DOCUMENTS FOR 24 HOURS.Please call the GI Lab if you have any nausea, vomiting, or abdominal pain.

## 2025-04-22 NOTE — ANESTHESIA POSTPROCEDURE EVALUATION
Anesthesia Post Evaluation    Patient: Phuc Thorne    Procedure(s) Performed: colonoscopy *    Final Anesthesia Type: MAC      Patient location during evaluation: GI PACU  Patient participation: Yes- Able to Participate  Level of consciousness: awake and alert, oriented and awake  Post-procedure vital signs: reviewed and stable  Pain management: adequate  Airway patency: patent    PONV status at discharge: No PONV  Anesthetic complications: no      Cardiovascular status: blood pressure returned to baseline, hemodynamically stable and stable  Respiratory status: unassisted and spontaneous ventilation  Hydration status: euvolemic  Follow-up not needed.              Vitals Value Taken Time   /77 04/22/25 12:31   Temp 36.1 °C (97 °F) 04/22/25 11:54   Pulse 48 04/22/25 12:31   Resp 12 04/22/25 12:31   SpO2 95 % 04/22/25 12:31   Vitals shown include unfiled device data.      No case tracking events are documented in the log.      Pain/Hair Score: Hair Score: 9 (4/22/2025 12:17 PM)

## 2025-04-22 NOTE — H&P
"[facility]  Gastroenterology  H&P    Patient Name: Phuc Thorne  MRN: [id number]  Admission Date: 2025  Code Status: Prior    Attending Provider: Isiah Silver MD   Primary Care Physician: Antonia Jeffries FNP  Principal Problem:<principal problem not specified>    Subjective:     History of Present Illness:  This patient is a 74-year-old man with history of colon polyps.  His last colonoscopy was in .    Past Medical History:   Diagnosis Date    Cobalamin deficiency     GERD (gastroesophageal reflux disease)     High cholesterol     Sleep apnea        Past Surgical History:   Procedure Laterality Date    CHOLECYSTECTOMY  2020    CHOLECYSTECTOMY  2020    COLONOSCOPY  2012    COLONOSCOPY  2017    repeat in 5 years    COLONOSCOPY  2021    repeat in 5 years    HAND SURGERY      HERNIA REPAIR      HIP SURGERY      NECK SURGERY      SHOULDER SURGERY Right        Review of patient's allergies indicates:   Allergen Reactions    Shrimp Nausea And Vomiting and Rash     "Passes out" pt/wife states "not allergic to iodine" and has received iodine without any problems-    Codeine Other (See Comments)    Opioids - morphine analogues Other (See Comments)     Family History       Problem Relation (Age of Onset)    Aortic aneurysm Paternal Grandfather          Tobacco Use    Smoking status: Former     Current packs/day: 0.00     Average packs/day: 1 pack/day for 6.0 years (6.0 ttl pk-yrs)     Types: Cigarettes     Start date:      Quit date:      Years since quittin.3     Passive exposure: Past    Smokeless tobacco: Never   Substance and Sexual Activity    Alcohol use: Not Currently    Drug use: Never    Sexual activity: Not on file     Review of Systems   Constitutional: Negative.    HENT: Negative.     Respiratory: Negative.     Cardiovascular: Negative.    Gastrointestinal: Negative.      Objective:     Vital Signs (Most Recent):  Temp: 97.4 °F (36.3 °C) (25 " "1030)  Pulse: (!) 53 (04/22/25 1030)  Resp: 12 (04/22/25 1030)  BP: 126/76 (04/22/25 1030)  SpO2: (!) 94 % (04/22/25 1030) Vital Signs (24h Range):  Temp:  [97.4 °F (36.3 °C)] 97.4 °F (36.3 °C)  Pulse:  [53] 53  Resp:  [12] 12  SpO2:  [94 %] 94 %  BP: (126)/(76) 126/76     Weight: 103 kg (227 lb) (04/22/25 1030)  Body mass index is 28.37 kg/m².    No intake or output data in the 24 hours ending 04/22/25 1125    Lines/Drains/Airways       Peripheral Intravenous Line  Duration                  Peripheral IV - Single Lumen 04/22/25 1030 22 G Left Hand <1 day                    Physical Exam  Vitals reviewed.   Constitutional:       General: He is not in acute distress.     Appearance: Normal appearance. He is well-developed. He is not ill-appearing.   HENT:      Head: Normocephalic and atraumatic.      Nose: Nose normal.   Eyes:      Pupils: Pupils are equal, round, and reactive to light.   Cardiovascular:      Rate and Rhythm: Normal rate and regular rhythm.   Pulmonary:      Effort: Pulmonary effort is normal.      Breath sounds: Normal breath sounds. No wheezing.   Abdominal:      General: Abdomen is flat. Bowel sounds are normal. There is no distension.      Palpations: Abdomen is soft.      Tenderness: There is no abdominal tenderness. There is no guarding.   Skin:     General: Skin is warm and dry.      Coloration: Skin is not jaundiced.   Neurological:      Mental Status: He is alert.   Psychiatric:         Attention and Perception: Attention normal.         Mood and Affect: Affect normal.         Speech: Speech normal.         Behavior: Behavior is cooperative.      Comments: Pt was calm while speaking.         Significant Labs:  CBC: No results for input(s): "WBC", "HGB", "HCT", "PLT" in the last 48 hours.  CMP: No results for input(s): "GLU", "CALCIUM", "ALBUMIN", "PROT", "NA", "K", "CO2", "CL", "BUN", "CREATININE", "ALKPHOS", "ALT", "AST", "BILITOT" in the last 48 hours.    Significant Imaging:  Imaging " results within the past 24 hours have been reviewed.    Assessment/Plan:     There are no hospital problems to display for this patient.        Impression:  Screening for colon neoplasm, history of colon polyps  Plan: Colonoscopy today    Isiah Silver MD  Gastroenterology  [facility]

## 2025-04-22 NOTE — ANESTHESIA PREPROCEDURE EVALUATION
04/22/2025  Phuc Thorne is a 74 y.o., male.      Pre-op Assessment    I have reviewed the Patient Summary Reports.    I have reviewed the NPO Status.   I have reviewed the Medications.     Review of Systems  Anesthesia Hx:  No problems with previous Anesthesia                Social:  No Alcohol Use, Non-Smoker       Pulmonary:        Sleep Apnea, CPAP                    Physical Exam  General: Well nourished, Cooperative, Alert and Oriented    Airway:  Mallampati: I   Mouth Opening: Normal        Anesthesia Plan  Type of Anesthesia, risks & benefits discussed:    Anesthesia Type: MAC  Intra-op Monitoring Plan: Standard ASA Monitors  Post Op Pain Control Plan: IV/PO Opioids PRN  Informed Consent: Informed consent signed with the Patient and all parties understand the risks and agree with anesthesia plan.  All questions answered.   ASA Score: 2    Ready For Surgery From Anesthesia Perspective.     .  Outpatient Medications as of 4/22/2025   Medication Sig Dispense Refill    albuterol (PROVENTIL/VENTOLIN HFA) 90 mcg/actuation inhaler Inhale 2 puffs into the lungs every 6 (six) hours as needed.      atorvastatin (LIPITOR) 40 MG tablet TAKE 1 TABLET BY MOUTH EVERY DAY 90 tablet 1    cholecalciferol, vitamin D3, (VITAMIN D3) 50 mcg (2,000 unit) Cap capsule Take 0.05 mg by mouth once daily.      doxycycline 50 MG capsule Take 1 capsule (50 mg total) by mouth once daily. 30 capsule 11    ketoconazole (NIZORAL) 2 % cream Apply topically once daily. 60 g 11    lisinopriL (PRINIVIL,ZESTRIL) 5 MG tablet Take 1 tablet (5 mg total) by mouth once daily. 30 tablet 1    metFORMIN (GLUCOPHAGE) 500 MG tablet Take 1 tablet (500 mg total) by mouth 2 (two) times daily with meals. 60 tablet 1    montelukast (SINGULAIR) 10 mg tablet TAKE 1 TABLET BY MOUTH EVERY DAY 90 tablet 1    tamsulosin (FLOMAX) 0.4 mg Cap TAKE 1 CAPSULE BY  MOUTH EVERY DAY 90 capsule 1     No current facility-administered medications on file as of 4/22/2025.      Past Medical History:   Diagnosis Date    Cobalamin deficiency     GERD (gastroesophageal reflux disease)     High cholesterol     Sleep apnea

## 2025-04-23 ENCOUNTER — RESULTS FOLLOW-UP (OUTPATIENT)
Dept: GASTROENTEROLOGY | Facility: CLINIC | Age: 75
End: 2025-04-23

## 2025-04-23 LAB
DHEA SERPL-MCNC: NORMAL
ESTROGEN SERPL-MCNC: NORMAL PG/ML
INSULIN SERPL-ACNC: NORMAL U[IU]/ML
LAB AP GROSS DESCRIPTION: NORMAL
LAB AP LABORATORY NOTES: NORMAL
T3RU NFR SERPL: NORMAL %

## 2025-04-23 NOTE — PROGRESS NOTES
The transverse colon polyp was hyperplastic.  Recommendation: Repeat colonoscopy in 5 years due to history of colon polyp.

## 2025-04-30 DIAGNOSIS — E11.9 TYPE 2 DIABETES MELLITUS WITHOUT COMPLICATION, WITHOUT LONG-TERM CURRENT USE OF INSULIN: ICD-10-CM

## 2025-04-30 DIAGNOSIS — I10 ESSENTIAL HYPERTENSION, BENIGN: ICD-10-CM

## 2025-04-30 RX ORDER — METFORMIN HYDROCHLORIDE 500 MG/1
500 TABLET ORAL 2 TIMES DAILY WITH MEALS
Qty: 180 TABLET | Refills: 1 | OUTPATIENT
Start: 2025-04-30

## 2025-04-30 RX ORDER — LISINOPRIL 5 MG/1
5 TABLET ORAL
Qty: 90 TABLET | Refills: 1 | OUTPATIENT
Start: 2025-04-30

## 2025-05-08 ENCOUNTER — OFFICE VISIT (OUTPATIENT)
Dept: FAMILY MEDICINE | Facility: CLINIC | Age: 75
End: 2025-05-08
Payer: MEDICARE

## 2025-05-08 VITALS
HEIGHT: 75 IN | SYSTOLIC BLOOD PRESSURE: 109 MMHG | HEART RATE: 66 BPM | RESPIRATION RATE: 15 BRPM | BODY MASS INDEX: 29.59 KG/M2 | WEIGHT: 238 LBS | OXYGEN SATURATION: 95 % | TEMPERATURE: 98 F | DIASTOLIC BLOOD PRESSURE: 69 MMHG

## 2025-05-08 DIAGNOSIS — E11.9 TYPE 2 DIABETES MELLITUS WITHOUT COMPLICATION, WITHOUT LONG-TERM CURRENT USE OF INSULIN: Primary | ICD-10-CM

## 2025-05-08 PROCEDURE — 99212 OFFICE O/P EST SF 10 MIN: CPT | Mod: ,,, | Performed by: NURSE PRACTITIONER

## 2025-05-08 NOTE — PROGRESS NOTES
"[facility]    Chief Complaint      Chief Complaint   Patient presents with    Diabetes     X1 month FU , patient reports his glucose this morning was 131 , states the metformin does give him nausea.        History of Present Illness      Phuc Thorne is a 74 y.o. male. He  has a past medical history of Cobalamin deficiency, GERD (gastroesophageal reflux disease), High cholesterol, and Sleep apnea., who presents today for x1 mo f/u for Diabetes.     Past Medical History:  Past Medical History:   Diagnosis Date    Cobalamin deficiency     GERD (gastroesophageal reflux disease)     High cholesterol     Sleep apnea        Past Surgical History:   has a past surgical history that includes Shoulder surgery (Right); Neck surgery; Colonoscopy (2012); Hip surgery; Hand surgery; Hernia repair; Cholecystectomy (12/2020); Colonoscopy (12/22/2017); Colonoscopy (03/21/2021); and Cholecystectomy (12/2020).    Social History:  Social History[1]    I personally reviewed all past medical, surgical, and social.     Review of Systems   Constitutional:  Negative for fatigue and unexpected weight change.   HENT:  Negative for sore throat.    Eyes:  Negative for visual disturbance.   Respiratory:  Negative for shortness of breath.    Cardiovascular: Negative.    Gastrointestinal:  Negative for abdominal pain, constipation, diarrhea, nausea and vomiting.   Genitourinary:  Negative for difficulty urinating.   Musculoskeletal:  Negative for gait problem.   Skin: Negative.    Neurological:  Negative for dizziness, light-headedness and headaches.        Medications:  Encounter Medications[2]    Allergies:  Review of patient's allergies indicates:   Allergen Reactions    Shrimp Nausea And Vomiting and Rash     "Passes out" pt/wife states "not allergic to iodine" and has received iodine without any problems-    Codeine Other (See Comments)    Opioids - morphine analogues Other (See Comments)       Health Maintenance:  Immunization " "History   Administered Date(s) Administered    COVID-19 MRNA, LN-S PF (MODERNA HALF 0.25 ML DOSE) 11/11/2021    COVID-19, MRNA, LN-S, PF (MODERNA FULL 0.5 ML DOSE) 01/19/2021, 02/21/2021    Influenza (FLUAD) - Quadrivalent - Adjuvanted - PF *Preferred* (65+) 10/25/2022, 10/16/2023    Influenza - Quadrivalent - High Dose - PF (65 years and older) 11/02/2021    Influenza - Quadrivalent - PF *Preferred* (6 months and older) 11/01/2017    Influenza - Trivalent - Fluad - Adjuvanted - PF (65 years and older 10/22/2024    Influenza - Trivalent - Fluzone High Dose - PF (65 years and older) 11/05/2018, 11/11/2019, 10/06/2020    Pneumococcal Conjugate - 13 Valent 11/05/2018    Pneumococcal Conjugate - 20 Valent 10/16/2023    Pneumococcal Polysaccharide - 23 Valent 11/01/2017    Tdap 07/27/2017      Health Maintenance   Topic Date Due    Diabetic Eye Exam  Never done    Shingles Vaccine (1 of 2) Never done    RSV Vaccine (Age 60+ and Pregnant patients) (1 - Risk 60-74 years 1-dose series) Never done    COVID-19 Vaccine (4 - 2024-25 season) 09/01/2024    Foot Exam  05/01/2025    Hemoglobin A1c  05/27/2025    Lipid Panel  02/27/2026    Diabetes Urine Screening  04/08/2026    Low Dose Statin  05/08/2026    TETANUS VACCINE  07/27/2027    Colorectal Cancer Screening  04/22/2030    Hepatitis C Screening  Completed    Influenza Vaccine  Completed    Pneumococcal Vaccines (Age 50+)  Completed    Abdominal Aortic Aneurysm Screening  Completed        Physical Exam      Vital Signs  Temp: 97.6 °F (36.4 °C)  Temp Source: Oral  Pulse: 66  Resp: 15  SpO2: 95 %  BP: 109/69  BP Location: Left arm  Patient Position: Sitting  Pain Score: 0-No pain  Height and Weight  Height: 6' 3" (190.5 cm)  Weight: 108 kg (238 lb)  BSA (Calculated - sq m): 2.39 sq meters  BMI (Calculated): 29.7  Weight in (lb) to have BMI = 25: 199.6]    Physical Exam  Vitals and nursing note reviewed.   Constitutional:       Appearance: Normal appearance.   HENT:      Head: " Normocephalic.      Right Ear: Hearing normal.      Left Ear: Hearing normal.      Nose: Nose normal.   Eyes:      General: Lids are normal.      Extraocular Movements: Extraocular movements intact.      Conjunctiva/sclera: Conjunctivae normal.      Pupils: Pupils are equal, round, and reactive to light.   Neck:      Thyroid: No thyromegaly.   Cardiovascular:      Rate and Rhythm: Normal rate and regular rhythm.      Heart sounds: Normal heart sounds.   Pulmonary:      Effort: Pulmonary effort is normal.      Breath sounds: Normal breath sounds.   Musculoskeletal:         General: Normal range of motion.      Cervical back: Normal range of motion and neck supple.      Right lower leg: No edema.      Left lower leg: No edema.   Skin:     General: Skin is warm and dry.      Findings: No rash.   Neurological:      General: No focal deficit present.      Mental Status: He is alert and oriented to person, place, and time.      Gait: Gait is intact.          Laboratory:  CBC:  Recent Labs   Lab 07/05/23  0843 05/01/24  0920 02/27/25  0917   WBC 5.83 5.68 5.76   RBC 4.91 4.80 4.84   Hemoglobin 15.7 15.5 15.5   Hematocrit 46.5 46.5 48.2   Platelet Count 145 L 149 L 152   MCV 94.7 96.9 H 99.6 H   MCH 32.0 H 32.3 H 32.0 H   MCHC 33.8 33.3 32.2     CMP:  Recent Labs   Lab 07/05/23  0843 05/01/24  0920 02/27/25  0917   Glucose 145 H 118 H 146 H   Calcium 9.2 9.3 9.0   Albumin 3.8 3.9 3.9   Total Protein 7.3 7.3 6.7   Sodium 142 140 140   Potassium 4.4 4.1 4.4   CO2 26 25 27   Chloride 108 H 107 106   BUN 11 12 12   Alk Phos 106 111 108   ALT 46 38 45   AST 41 H 33 57 H   Bilirubin, Total 0.6 1.3 H 1.2     LIPIDS:  Recent Labs   Lab 07/05/23  0843 05/01/24  0920 02/27/25  0917   TSH 1.520  --   --    HDL Cholesterol 47 49 39   Cholesterol 138 140 127   Triglycerides 131 119 97   LDL Calculated 65 67 69   Cholesterol/HDL Ratio (Risk Factor) 2.9 2.9 3.3   Non-HDL 91 91 88     TSH:  Recent Labs   Lab 07/05/23  0843   TSH 1.520      A1C:  Recent Labs   Lab 23  0843 10/16/23  0902 24  0920 25  0917   Hemoglobin A1C 7.0 H 6.4 6.9 H 8.6 H       Assessment/Plan     Phuc Thorne is a 74 y.o.male with:     1. Type 2 diabetes mellitus without complication, without long-term current use of insulin         Total time spent face-to-face and non-face-to-face coordinating care for this encounter was: 10 min    Chronic conditions status updated as per HPI.  Other than changes above, cont current medications and maintain follow up with specialists.  Return to clinic in 2 month(s).    Antonia Jeffries, FNP  [facility]         [1]   Social History  Tobacco Use    Smoking status: Former     Current packs/day: 0.00     Average packs/day: 1 pack/day for 6.0 years (6.0 ttl pk-yrs)     Types: Cigarettes     Start date:      Quit date:      Years since quittin.4     Passive exposure: Past    Smokeless tobacco: Never   Substance Use Topics    Alcohol use: Not Currently    Drug use: Never   [2]   Outpatient Encounter Medications as of 2025   Medication Sig Dispense Refill    albuterol (PROVENTIL/VENTOLIN HFA) 90 mcg/actuation inhaler Inhale 2 puffs into the lungs every 6 (six) hours as needed.      atorvastatin (LIPITOR) 40 MG tablet TAKE 1 TABLET BY MOUTH EVERY DAY 90 tablet 1    cholecalciferol, vitamin D3, (VITAMIN D3) 50 mcg (2,000 unit) Cap capsule Take 0.05 mg by mouth once daily.      doxycycline 50 MG capsule Take 1 capsule (50 mg total) by mouth once daily. 30 capsule 11    ketoconazole (NIZORAL) 2 % cream Apply topically once daily. 60 g 11    lisinopriL (PRINIVIL,ZESTRIL) 5 MG tablet Take 1 tablet (5 mg total) by mouth once daily. 30 tablet 1    metFORMIN (GLUCOPHAGE) 500 MG tablet Take 1 tablet (500 mg total) by mouth 2 (two) times daily with meals. 60 tablet 1    montelukast (SINGULAIR) 10 mg tablet TAKE 1 TABLET BY MOUTH EVERY DAY 90 tablet 1    tamsulosin (FLOMAX) 0.4 mg Cap TAKE 1 CAPSULE BY MOUTH EVERY DAY  90 capsule 1    [DISCONTINUED] montelukast (SINGULAIR) 10 mg tablet TAKE 1 TABLET BY MOUTH EVERY DAY 90 tablet 1    [DISCONTINUED] tamsulosin (FLOMAX) 0.4 mg Cap TAKE 1 CAPSULE BY MOUTH EVERY DAY 90 capsule 1    [DISCONTINUED] lactated ringers infusion       [DISCONTINUED] sodium chloride 0.9% flush 10 mL        No facility-administered encounter medications on file as of 5/8/2025.

## 2025-05-30 DIAGNOSIS — E11.9 TYPE 2 DIABETES MELLITUS WITHOUT COMPLICATION, WITHOUT LONG-TERM CURRENT USE OF INSULIN: ICD-10-CM

## 2025-05-30 RX ORDER — METFORMIN HYDROCHLORIDE 500 MG/1
500 TABLET ORAL 2 TIMES DAILY WITH MEALS
Qty: 180 TABLET | Refills: 1 | OUTPATIENT
Start: 2025-05-30

## 2025-05-31 DIAGNOSIS — E11.9 TYPE 2 DIABETES MELLITUS WITHOUT COMPLICATION, WITHOUT LONG-TERM CURRENT USE OF INSULIN: ICD-10-CM

## 2025-05-31 DIAGNOSIS — I10 ESSENTIAL HYPERTENSION, BENIGN: ICD-10-CM

## 2025-06-02 RX ORDER — METFORMIN HYDROCHLORIDE 500 MG/1
500 TABLET ORAL 2 TIMES DAILY WITH MEALS
Qty: 60 TABLET | Refills: 1 | OUTPATIENT
Start: 2025-06-02

## 2025-06-02 RX ORDER — LISINOPRIL 5 MG/1
5 TABLET ORAL DAILY
Qty: 30 TABLET | Refills: 1 | OUTPATIENT
Start: 2025-06-02

## 2025-06-03 DIAGNOSIS — I10 ESSENTIAL HYPERTENSION, BENIGN: ICD-10-CM

## 2025-06-03 DIAGNOSIS — E11.9 TYPE 2 DIABETES MELLITUS WITHOUT COMPLICATION, WITHOUT LONG-TERM CURRENT USE OF INSULIN: ICD-10-CM

## 2025-06-03 RX ORDER — LISINOPRIL 5 MG/1
5 TABLET ORAL
Qty: 30 TABLET | Refills: 1 | Status: SHIPPED | OUTPATIENT
Start: 2025-06-03

## 2025-06-03 RX ORDER — METFORMIN HYDROCHLORIDE 500 MG/1
500 TABLET ORAL 2 TIMES DAILY WITH MEALS
Qty: 60 TABLET | Refills: 1 | Status: SHIPPED | OUTPATIENT
Start: 2025-06-03

## 2025-06-11 DIAGNOSIS — E78.5 HYPERLIPIDEMIA, UNSPECIFIED HYPERLIPIDEMIA TYPE: ICD-10-CM

## 2025-06-12 RX ORDER — ATORVASTATIN CALCIUM 40 MG/1
40 TABLET, FILM COATED ORAL
Qty: 90 TABLET | Refills: 1 | Status: SHIPPED | OUTPATIENT
Start: 2025-06-12

## 2025-06-26 DIAGNOSIS — I10 ESSENTIAL HYPERTENSION, BENIGN: ICD-10-CM

## 2025-06-26 RX ORDER — LISINOPRIL 5 MG/1
5 TABLET ORAL
Qty: 90 TABLET | Refills: 1 | OUTPATIENT
Start: 2025-06-26

## 2025-07-07 ENCOUNTER — OFFICE VISIT (OUTPATIENT)
Dept: FAMILY MEDICINE | Facility: CLINIC | Age: 75
End: 2025-07-07
Payer: MEDICARE

## 2025-07-07 VITALS
SYSTOLIC BLOOD PRESSURE: 130 MMHG | DIASTOLIC BLOOD PRESSURE: 74 MMHG | HEART RATE: 45 BPM | HEIGHT: 75 IN | RESPIRATION RATE: 14 BRPM | BODY MASS INDEX: 29.84 KG/M2 | OXYGEN SATURATION: 96 % | WEIGHT: 240 LBS | TEMPERATURE: 98 F

## 2025-07-07 DIAGNOSIS — I10 ESSENTIAL HYPERTENSION, BENIGN: ICD-10-CM

## 2025-07-07 DIAGNOSIS — J30.2 SEASONAL ALLERGIES: ICD-10-CM

## 2025-07-07 DIAGNOSIS — E78.5 HYPERLIPIDEMIA, UNSPECIFIED HYPERLIPIDEMIA TYPE: ICD-10-CM

## 2025-07-07 DIAGNOSIS — E11.9 TYPE 2 DIABETES MELLITUS WITHOUT COMPLICATION, WITHOUT LONG-TERM CURRENT USE OF INSULIN: Primary | ICD-10-CM

## 2025-07-07 DIAGNOSIS — N40.0 BENIGN PROSTATIC HYPERPLASIA, UNSPECIFIED WHETHER LOWER URINARY TRACT SYMPTOMS PRESENT: ICD-10-CM

## 2025-07-07 LAB
ALBUMIN SERPL BCP-MCNC: 3.9 G/DL (ref 3.4–4.8)
ALBUMIN/GLOB SERPL: 1.4 {RATIO}
ALP SERPL-CCNC: 78 U/L (ref 40–150)
ALT SERPL W P-5'-P-CCNC: 23 U/L
ANION GAP SERPL CALCULATED.3IONS-SCNC: 14 MMOL/L (ref 7–16)
AST SERPL W P-5'-P-CCNC: 23 U/L (ref 11–45)
BILIRUB SERPL-MCNC: 0.7 MG/DL
BUN SERPL-MCNC: 20 MG/DL (ref 8–26)
BUN/CREAT SERPL: 22 (ref 6–20)
CALCIUM SERPL-MCNC: 8.7 MG/DL (ref 8.8–10)
CHLORIDE SERPL-SCNC: 107 MMOL/L (ref 98–107)
CO2 SERPL-SCNC: 24 MMOL/L (ref 23–31)
CREAT SERPL-MCNC: 0.93 MG/DL (ref 0.72–1.25)
EGFR (NO RACE VARIABLE) (RUSH/TITUS): 86 ML/MIN/1.73M2
EST. AVERAGE GLUCOSE BLD GHB EST-MCNC: 157 MG/DL
GLOBULIN SER-MCNC: 2.7 G/DL (ref 2–4)
GLUCOSE SERPL-MCNC: 107 MG/DL (ref 82–115)
HBA1C MFR BLD HPLC: 7.1 %
POTASSIUM SERPL-SCNC: 4.4 MMOL/L (ref 3.5–5.1)
PROT SERPL-MCNC: 6.6 G/DL (ref 5.8–7.6)
SODIUM SERPL-SCNC: 141 MMOL/L (ref 136–145)

## 2025-07-07 PROCEDURE — 80053 COMPREHEN METABOLIC PANEL: CPT | Mod: ,,, | Performed by: CLINICAL MEDICAL LABORATORY

## 2025-07-07 PROCEDURE — 99213 OFFICE O/P EST LOW 20 MIN: CPT | Mod: ,,, | Performed by: NURSE PRACTITIONER

## 2025-07-07 PROCEDURE — 83036 HEMOGLOBIN GLYCOSYLATED A1C: CPT | Mod: ,,, | Performed by: CLINICAL MEDICAL LABORATORY

## 2025-07-07 RX ORDER — ATORVASTATIN CALCIUM 40 MG/1
40 TABLET, FILM COATED ORAL DAILY
Qty: 90 TABLET | Refills: 1 | Status: SHIPPED | OUTPATIENT
Start: 2025-07-07

## 2025-07-07 RX ORDER — METFORMIN HYDROCHLORIDE 500 MG/1
500 TABLET ORAL 2 TIMES DAILY WITH MEALS
Qty: 60 TABLET | Refills: 1 | Status: SHIPPED | OUTPATIENT
Start: 2025-07-07

## 2025-07-07 RX ORDER — LISINOPRIL 5 MG/1
5 TABLET ORAL DAILY
Qty: 90 TABLET | Refills: 1 | Status: SHIPPED | OUTPATIENT
Start: 2025-07-07

## 2025-07-07 RX ORDER — MONTELUKAST SODIUM 10 MG/1
10 TABLET ORAL NIGHTLY
Qty: 90 TABLET | Refills: 1 | Status: SHIPPED | OUTPATIENT
Start: 2025-07-07

## 2025-07-07 RX ORDER — TAMSULOSIN HYDROCHLORIDE 0.4 MG/1
1 CAPSULE ORAL DAILY
Qty: 90 CAPSULE | Refills: 1 | Status: SHIPPED | OUTPATIENT
Start: 2025-07-07

## 2025-07-07 NOTE — PROGRESS NOTES
[facility]    Chief Complaint      Chief Complaint   Patient presents with    Diabetes     Nausea with the metformin has gotten better states his sugars have been better; still has some nausea with the metformin so just wanted to know if he could change it what would it be to?       History of Present Illness      Phuc Thorne is a 74 y.o. male. He  has a past medical history of Allergy (01/2005), Cobalamin deficiency, Diabetes mellitus (05/24), GERD (gastroesophageal reflux disease), High cholesterol, and Sleep apnea., who presents today for f/u of his diabetes.  Reports he has been working on his diet but has not been perfect with it due to having his grandchildren for the summer.  He does state that he is checking it and his readings have been a lot better- this morning was 119.     Past Medical History:  Past Medical History:   Diagnosis Date    Allergy 01/2005    Dust    Cobalamin deficiency     Diabetes mellitus 05/24    GERD (gastroesophageal reflux disease)     High cholesterol     Sleep apnea        Past Surgical History:   has a past surgical history that includes Shoulder surgery (Right); Neck surgery; Colonoscopy (2012); Hip surgery; Hand surgery; Hernia repair; Cholecystectomy (12/2020); Colonoscopy (12/22/2017); Colonoscopy (03/21/2021); Cholecystectomy (12/2020); and Fracture surgery (12/2015).    Social History:  Social History[1]    I personally reviewed all past medical, surgical, and social.     Review of Systems   Constitutional:  Negative for fatigue and unexpected weight change.   HENT:  Negative for sore throat.    Eyes:  Negative for visual disturbance.   Respiratory:  Negative for shortness of breath.    Cardiovascular: Negative.    Gastrointestinal:  Negative for abdominal pain, constipation, diarrhea, nausea and vomiting.   Genitourinary:  Negative for difficulty urinating.   Musculoskeletal:  Negative for gait problem.   Skin: Negative.    Neurological:  Negative for dizziness,  "light-headedness and headaches.        Medications:  Encounter Medications[2]    Allergies:  Review of patient's allergies indicates:   Allergen Reactions    Shrimp Nausea And Vomiting and Rash     "Passes out" pt/wife states "not allergic to iodine" and has received iodine without any problems-    Codeine Other (See Comments)    Opioids - morphine analogues Other (See Comments)       Health Maintenance:  Immunization History   Administered Date(s) Administered    COVID-19 MRNA, LN-S PF (MODERNA HALF 0.25 ML DOSE) 11/11/2021    COVID-19, MRNA, LN-S, PF (MODERNA FULL 0.5 ML DOSE) 01/19/2021, 02/21/2021    Influenza (FLUAD) - Quadrivalent - Adjuvanted - PF *Preferred* (65+) 10/25/2022, 10/16/2023    Influenza - Quadrivalent - High Dose - PF (65 years and older) 11/02/2021    Influenza - Quadrivalent - PF *Preferred* (6 months and older) 11/01/2017    Influenza - Trivalent - Fluad - Adjuvanted - PF (65 years and older 10/22/2024    Influenza - Trivalent - Fluzone High Dose - PF (65 years and older) 11/05/2018, 11/11/2019, 10/06/2020    Pneumococcal Conjugate - 13 Valent 11/05/2018    Pneumococcal Conjugate - 20 Valent 10/16/2023    Pneumococcal Polysaccharide - 23 Valent 11/01/2017    Tdap 07/27/2017      Health Maintenance   Topic Date Due    Shingles Vaccine (1 of 2) Never done    RSV Vaccine (Age 60+ and Pregnant patients) (1 - Risk 60-74 years 1-dose series) Never done    COVID-19 Vaccine (4 - 2024-25 season) 09/01/2024    Foot Exam  05/01/2025    Hemoglobin A1c  05/27/2025    Influenza Vaccine (1) 09/01/2025    Diabetic Eye Exam  12/20/2025    Lipid Panel  02/27/2026    Diabetes Urine Screening  04/08/2026    Low Dose Statin  07/07/2026    TETANUS VACCINE  07/27/2027    Colorectal Cancer Screening  04/22/2030    Hepatitis C Screening  Completed    Pneumococcal Vaccines (Age 50+)  Completed    Abdominal Aortic Aneurysm Screening  Completed        Physical Exam      Vital Signs  Temp: 98 °F (36.7 °C)  Temp Source: " "Oral  Pulse: (!) 45 (followed by cardiology and states normal for him)  Resp: 14  SpO2: 96 %  BP: 130/74  Pain Score: 0-No pain  Height and Weight  Height: 6' 3" (190.5 cm)  Weight: 108.9 kg (240 lb)  BSA (Calculated - sq m): 2.4 sq meters  BMI (Calculated): 30  Weight in (lb) to have BMI = 25: 199.6]    Physical Exam  Vitals and nursing note reviewed.   Constitutional:       Appearance: Normal appearance.   HENT:      Head: Normocephalic.      Right Ear: Hearing normal.      Left Ear: Hearing normal.      Nose: Nose normal.   Eyes:      General: Lids are normal.      Extraocular Movements: Extraocular movements intact.      Conjunctiva/sclera: Conjunctivae normal.      Pupils: Pupils are equal, round, and reactive to light.   Neck:      Thyroid: No thyromegaly.   Cardiovascular:      Rate and Rhythm: Regular rhythm. Bradycardia present.      Heart sounds: Normal heart sounds.      Comments: HR 45- patient is followed by Cardiology- this is his norm  Pulmonary:      Effort: Pulmonary effort is normal.      Breath sounds: Normal breath sounds.   Musculoskeletal:         General: Normal range of motion.      Cervical back: Normal range of motion and neck supple.      Right lower leg: No edema.      Left lower leg: No edema.   Skin:     General: Skin is warm and dry.      Findings: No rash.   Neurological:      General: No focal deficit present.      Mental Status: He is alert and oriented to person, place, and time.      Gait: Gait is intact.          Laboratory:  CBC:  Recent Labs   Lab 07/05/23  0843 05/01/24  0920 02/27/25  0917   WBC 5.83 5.68 5.76   RBC 4.91 4.80 4.84   Hemoglobin 15.7 15.5 15.5   Hematocrit 46.5 46.5 48.2   Platelet Count 145 L 149 L 152   MCV 94.7 96.9 H 99.6 H   MCH 32.0 H 32.3 H 32.0 H   MCHC 33.8 33.3 32.2     CMP:  Recent Labs   Lab 07/05/23  0843 05/01/24  0920 02/27/25  0917   Glucose 145 H 118 H 146 H   Calcium 9.2 9.3 9.0   Albumin 3.8 3.9 3.9   Total Protein 7.3 7.3 6.7   Sodium 142 " 140 140   Potassium 4.4 4.1 4.4   CO2 26 25 27   Chloride 108 H 107 106   BUN 11 12 12   Alk Phos 106 111 108   ALT 46 38 45   AST 41 H 33 57 H   Bilirubin, Total 0.6 1.3 H 1.2     LIPIDS:  Recent Labs   Lab 07/05/23  0843 05/01/24  0920 02/27/25  0917   TSH 1.520  --   --    HDL Cholesterol 47 49 39   Cholesterol 138 140 127   Triglycerides 131 119 97   LDL Calculated 65 67 69   Cholesterol/HDL Ratio (Risk Factor) 2.9 2.9 3.3   Non-HDL 91 91 88     TSH:  Recent Labs   Lab 07/05/23  0843   TSH 1.520     A1C:  Recent Labs   Lab 07/05/23  0843 10/16/23  0902 05/01/24  0920 02/27/25  0917   Hemoglobin A1C 7.0 H 6.4 6.9 H 8.6 H       Assessment/Plan     Phuc Thorne is a 74 y.o.male with:     1. Type 2 diabetes mellitus without complication, without long-term current use of insulin  -     Hemoglobin A1C; Future; Expected date: 07/07/2025  -     Comprehensive Metabolic Panel; Future; Expected date: 07/07/2025  -     metFORMIN (GLUCOPHAGE) 500 MG tablet; Take 1 tablet (500 mg total) by mouth 2 (two) times daily with meals.  Dispense: 60 tablet; Refill: 1    2. Hyperlipidemia, unspecified hyperlipidemia type  -     atorvastatin (LIPITOR) 40 MG tablet; Take 1 tablet (40 mg total) by mouth once daily.  Dispense: 90 tablet; Refill: 1    3. Essential hypertension, benign  -     lisinopriL (PRINIVIL,ZESTRIL) 5 MG tablet; Take 1 tablet (5 mg total) by mouth once daily.  Dispense: 90 tablet; Refill: 1    4. Seasonal allergies  -     montelukast (SINGULAIR) 10 mg tablet; Take 1 tablet (10 mg total) by mouth every evening.  Dispense: 90 tablet; Refill: 1    5. Benign prostatic hyperplasia, unspecified whether lower urinary tract symptoms present  -     tamsulosin (FLOMAX) 0.4 mg Cap; Take 1 capsule (0.4 mg total) by mouth once daily.  Dispense: 90 capsule; Refill: 1         Awaiting most recent A1c; if still above 8% will look into adding Jardiance     Total time spent face-to-face and non-face-to-face coordinating care for this  encounter was: 20 minutes     Chronic conditions status updated as per HPI.  Other than changes above, cont current medications and maintain follow up with specialists.  Return to clinic in 3 month(s).    Antonia Jeffries, UCHEP  [facility]         [1]   Social History  Tobacco Use    Smoking status: Former     Current packs/day: 0.00     Average packs/day: 1 pack/day for 6.0 years (6.0 ttl pk-yrs)     Types: Cigarettes     Start date: 1969     Quit date: 1976     Years since quittin.8     Passive exposure: Past    Smokeless tobacco: Never    Tobacco comments:     Have not smoke in 45 yrs   Substance Use Topics    Alcohol use: Not Currently    Drug use: Never   [2]   Outpatient Encounter Medications as of 2025   Medication Sig Dispense Refill    albuterol (PROVENTIL/VENTOLIN HFA) 90 mcg/actuation inhaler Inhale 2 puffs into the lungs every 6 (six) hours as needed.      cholecalciferol, vitamin D3, (VITAMIN D3) 50 mcg (2,000 unit) Cap capsule Take 0.05 mg by mouth once daily.      doxycycline 50 MG capsule Take 1 capsule (50 mg total) by mouth once daily. 30 capsule 11    ketoconazole (NIZORAL) 2 % cream Apply topically once daily. 60 g 11    [DISCONTINUED] atorvastatin (LIPITOR) 40 MG tablet TAKE 1 TABLET BY MOUTH EVERY DAY 90 tablet 1    [DISCONTINUED] lisinopriL (PRINIVIL,ZESTRIL) 5 MG tablet TAKE 1 TABLET BY MOUTH EVERY DAY 30 tablet 1    [DISCONTINUED] metFORMIN (GLUCOPHAGE) 500 MG tablet TAKE 1 TABLET BY MOUTH TWICE A DAY WITH MEALS 60 tablet 1    [DISCONTINUED] montelukast (SINGULAIR) 10 mg tablet TAKE 1 TABLET BY MOUTH EVERY DAY 90 tablet 1    [DISCONTINUED] tamsulosin (FLOMAX) 0.4 mg Cap TAKE 1 CAPSULE BY MOUTH EVERY DAY 90 capsule 1    atorvastatin (LIPITOR) 40 MG tablet Take 1 tablet (40 mg total) by mouth once daily. 90 tablet 1    lisinopriL (PRINIVIL,ZESTRIL) 5 MG tablet Take 1 tablet (5 mg total) by mouth once daily. 90 tablet 1    metFORMIN (GLUCOPHAGE) 500 MG tablet Take 1  tablet (500 mg total) by mouth 2 (two) times daily with meals. 60 tablet 1    montelukast (SINGULAIR) 10 mg tablet Take 1 tablet (10 mg total) by mouth every evening. 90 tablet 1    tamsulosin (FLOMAX) 0.4 mg Cap Take 1 capsule (0.4 mg total) by mouth once daily. 90 capsule 1     No facility-administered encounter medications on file as of 7/7/2025.

## 2025-08-15 ENCOUNTER — OFFICE VISIT (OUTPATIENT)
Dept: FAMILY MEDICINE | Facility: CLINIC | Age: 75
End: 2025-08-15
Payer: MEDICARE

## 2025-08-15 VITALS
RESPIRATION RATE: 19 BRPM | DIASTOLIC BLOOD PRESSURE: 70 MMHG | HEART RATE: 58 BPM | TEMPERATURE: 98 F | SYSTOLIC BLOOD PRESSURE: 142 MMHG | WEIGHT: 234 LBS | HEIGHT: 75 IN | OXYGEN SATURATION: 96 % | BODY MASS INDEX: 29.09 KG/M2

## 2025-08-15 DIAGNOSIS — L98.9 DISORDER OF SKIN AND SUBCUTANEOUS TISSUE: Primary | ICD-10-CM

## 2025-08-15 PROCEDURE — 99212 OFFICE O/P EST SF 10 MIN: CPT | Mod: ,,, | Performed by: NURSE PRACTITIONER

## 2025-08-15 RX ORDER — SULFAMETHOXAZOLE AND TRIMETHOPRIM 800; 160 MG/1; MG/1
1 TABLET ORAL 2 TIMES DAILY
Qty: 14 TABLET | Refills: 0 | Status: SHIPPED | OUTPATIENT
Start: 2025-08-15

## 2025-08-20 DIAGNOSIS — E11.9 TYPE 2 DIABETES MELLITUS WITHOUT COMPLICATION, WITHOUT LONG-TERM CURRENT USE OF INSULIN: ICD-10-CM

## 2025-08-20 RX ORDER — METFORMIN HYDROCHLORIDE 500 MG/1
500 TABLET ORAL 2 TIMES DAILY WITH MEALS
Qty: 180 TABLET | Refills: 1 | OUTPATIENT
Start: 2025-08-20

## 2025-09-05 ENCOUNTER — OFFICE VISIT (OUTPATIENT)
Dept: FAMILY MEDICINE | Facility: CLINIC | Age: 75
End: 2025-09-05
Payer: MEDICARE

## 2025-09-05 VITALS
WEIGHT: 238 LBS | RESPIRATION RATE: 15 BRPM | BODY MASS INDEX: 29.59 KG/M2 | HEIGHT: 75 IN | OXYGEN SATURATION: 96 % | SYSTOLIC BLOOD PRESSURE: 125 MMHG | TEMPERATURE: 98 F | DIASTOLIC BLOOD PRESSURE: 77 MMHG | HEART RATE: 70 BPM

## 2025-09-05 DIAGNOSIS — R09.81 NASAL CONGESTION: ICD-10-CM

## 2025-09-05 DIAGNOSIS — J06.9 VIRAL UPPER RESPIRATORY TRACT INFECTION WITH COUGH: Primary | ICD-10-CM

## 2025-09-05 DIAGNOSIS — R05.1 ACUTE COUGH: ICD-10-CM

## 2025-09-05 LAB
CTP QC/QA: YES
CTP QC/QA: YES
POC MOLECULAR INFLUENZA A AGN: NEGATIVE
POC MOLECULAR INFLUENZA B AGN: NEGATIVE
SARS-COV-2 RDRP RESP QL NAA+PROBE: NEGATIVE

## 2025-09-05 RX ORDER — IPRATROPIUM BROMIDE 21 UG/1
2 SPRAY, METERED NASAL 2 TIMES DAILY
Qty: 30 ML | Refills: 0 | Status: SHIPPED | OUTPATIENT
Start: 2025-09-05

## 2025-09-05 RX ORDER — DEXAMETHASONE SODIUM PHOSPHATE 4 MG/ML
4 INJECTION, SOLUTION INTRA-ARTICULAR; INTRALESIONAL; INTRAMUSCULAR; INTRAVENOUS; SOFT TISSUE
Status: COMPLETED | OUTPATIENT
Start: 2025-09-05 | End: 2025-09-05

## 2025-09-05 RX ADMIN — DEXAMETHASONE SODIUM PHOSPHATE 4 MG: 4 INJECTION, SOLUTION INTRA-ARTICULAR; INTRALESIONAL; INTRAMUSCULAR; INTRAVENOUS; SOFT TISSUE at 10:09
